# Patient Record
Sex: FEMALE
[De-identification: names, ages, dates, MRNs, and addresses within clinical notes are randomized per-mention and may not be internally consistent; named-entity substitution may affect disease eponyms.]

---

## 2017-08-02 PROBLEM — Z00.00 ENCOUNTER FOR PREVENTIVE HEALTH EXAMINATION: Status: ACTIVE | Noted: 2017-08-02

## 2017-09-28 ENCOUNTER — APPOINTMENT (OUTPATIENT)
Dept: ENDOCRINOLOGY | Facility: CLINIC | Age: 62
End: 2017-09-28

## 2017-10-13 ENCOUNTER — MEDICATION RENEWAL (OUTPATIENT)
Age: 62
End: 2017-10-13

## 2018-01-11 ENCOUNTER — APPOINTMENT (OUTPATIENT)
Dept: ENDOCRINOLOGY | Facility: CLINIC | Age: 63
End: 2018-01-11
Payer: COMMERCIAL

## 2018-01-11 VITALS
HEART RATE: 74 BPM | HEIGHT: 67 IN | DIASTOLIC BLOOD PRESSURE: 100 MMHG | OXYGEN SATURATION: 99 % | TEMPERATURE: 97.6 F | WEIGHT: 202 LBS | BODY MASS INDEX: 31.71 KG/M2 | SYSTOLIC BLOOD PRESSURE: 155 MMHG

## 2018-01-11 DIAGNOSIS — Z83.49 FAMILY HISTORY OF OTHER ENDOCRINE, NUTRITIONAL AND METABOLIC DISEASES: ICD-10-CM

## 2018-01-11 DIAGNOSIS — D25.9 LEIOMYOMA OF UTERUS, UNSPECIFIED: ICD-10-CM

## 2018-01-11 DIAGNOSIS — E55.9 VITAMIN D DEFICIENCY, UNSPECIFIED: ICD-10-CM

## 2018-01-11 DIAGNOSIS — K76.0 FATTY (CHANGE OF) LIVER, NOT ELSEWHERE CLASSIFIED: ICD-10-CM

## 2018-01-11 DIAGNOSIS — Z82.49 FAMILY HISTORY OF ISCHEMIC HEART DISEASE AND OTHER DISEASES OF THE CIRCULATORY SYSTEM: ICD-10-CM

## 2018-01-11 PROCEDURE — 99215 OFFICE O/P EST HI 40 MIN: CPT

## 2018-01-14 PROBLEM — Z82.49 FAMILY HISTORY OF HYPERTENSION: Status: ACTIVE | Noted: 2018-01-14

## 2018-01-14 PROBLEM — E55.9 VITAMIN D DEFICIENCY: Status: ACTIVE | Noted: 2018-01-14

## 2018-01-14 PROBLEM — K76.0 HEPATIC STEATOSIS: Status: ACTIVE | Noted: 2018-01-14

## 2018-01-14 PROBLEM — D25.9 FIBROID UTERUS: Status: ACTIVE | Noted: 2018-01-14

## 2018-01-14 PROBLEM — Z83.49 FAMILY HISTORY OF GOITER: Status: ACTIVE | Noted: 2018-01-14

## 2018-01-14 RX ORDER — BLOOD-GLUCOSE METER
EACH MISCELLANEOUS
Qty: 1 | Refills: 3 | Status: ACTIVE | COMMUNITY
Start: 2018-01-14 | End: 1900-01-01

## 2018-01-14 RX ORDER — CHLORHEXIDINE GLUCONATE 4 %
1000 LIQUID (ML) TOPICAL
Refills: 0 | Status: ACTIVE | COMMUNITY
Start: 2018-01-14

## 2018-01-14 RX ORDER — ENALAPRIL MALEATE 20 MG/1
20 TABLET ORAL TWICE DAILY
Qty: 180 | Refills: 3 | Status: ACTIVE | COMMUNITY
Start: 2018-01-14 | End: 1900-01-01

## 2018-01-14 RX ORDER — UBIDECARENONE/VIT E ACET 100MG-5
50 MCG CAPSULE ORAL
Refills: 0 | Status: ACTIVE | COMMUNITY
Start: 2018-01-14

## 2018-01-14 RX ORDER — ATORVASTATIN CALCIUM 80 MG/1
80 TABLET, FILM COATED ORAL
Qty: 90 | Refills: 3 | Status: ACTIVE | COMMUNITY
Start: 2018-01-14 | End: 1900-01-01

## 2018-01-14 RX ORDER — LEVOTHYROXINE SODIUM 0.07 MG/1
75 TABLET ORAL
Qty: 90 | Refills: 3 | Status: ACTIVE | COMMUNITY
Start: 2018-01-14 | End: 1900-01-01

## 2018-04-30 ENCOUNTER — APPOINTMENT (OUTPATIENT)
Dept: ENDOCRINOLOGY | Facility: CLINIC | Age: 63
End: 2018-04-30
Payer: COMMERCIAL

## 2018-04-30 VITALS
DIASTOLIC BLOOD PRESSURE: 96 MMHG | SYSTOLIC BLOOD PRESSURE: 156 MMHG | OXYGEN SATURATION: 98 % | TEMPERATURE: 97.9 F | HEART RATE: 74 BPM | WEIGHT: 203 LBS | HEIGHT: 67 IN | BODY MASS INDEX: 31.86 KG/M2

## 2018-04-30 DIAGNOSIS — R20.0 ANESTHESIA OF SKIN: ICD-10-CM

## 2018-04-30 PROCEDURE — 99214 OFFICE O/P EST MOD 30 MIN: CPT

## 2018-06-07 ENCOUNTER — MEDICATION RENEWAL (OUTPATIENT)
Age: 63
End: 2018-06-07

## 2018-07-23 ENCOUNTER — APPOINTMENT (OUTPATIENT)
Dept: ENDOCRINOLOGY | Facility: CLINIC | Age: 63
End: 2018-07-23
Payer: COMMERCIAL

## 2018-07-23 VITALS
SYSTOLIC BLOOD PRESSURE: 157 MMHG | DIASTOLIC BLOOD PRESSURE: 90 MMHG | BODY MASS INDEX: 32.14 KG/M2 | RESPIRATION RATE: 16 BRPM | TEMPERATURE: 98.8 F | WEIGHT: 200 LBS | HEART RATE: 80 BPM | HEIGHT: 66 IN | OXYGEN SATURATION: 97 %

## 2018-07-23 PROCEDURE — 99214 OFFICE O/P EST MOD 30 MIN: CPT

## 2018-10-25 ENCOUNTER — APPOINTMENT (OUTPATIENT)
Dept: ENDOCRINOLOGY | Facility: CLINIC | Age: 63
End: 2018-10-25
Payer: COMMERCIAL

## 2018-10-25 VITALS
BODY MASS INDEX: 32.14 KG/M2 | TEMPERATURE: 98.6 F | HEIGHT: 66 IN | HEART RATE: 73 BPM | DIASTOLIC BLOOD PRESSURE: 84 MMHG | WEIGHT: 200 LBS | OXYGEN SATURATION: 94 % | SYSTOLIC BLOOD PRESSURE: 153 MMHG

## 2018-10-25 VITALS — BODY MASS INDEX: 32.38 KG/M2 | HEIGHT: 66 IN | WEIGHT: 201.5 LBS

## 2018-10-25 PROCEDURE — 99214 OFFICE O/P EST MOD 30 MIN: CPT

## 2019-01-18 ENCOUNTER — MEDICATION RENEWAL (OUTPATIENT)
Age: 64
End: 2019-01-18

## 2019-02-07 ENCOUNTER — APPOINTMENT (OUTPATIENT)
Dept: ENDOCRINOLOGY | Facility: CLINIC | Age: 64
End: 2019-02-07
Payer: COMMERCIAL

## 2019-02-07 VITALS
HEART RATE: 73 BPM | OXYGEN SATURATION: 99 % | TEMPERATURE: 98.7 F | SYSTOLIC BLOOD PRESSURE: 159 MMHG | DIASTOLIC BLOOD PRESSURE: 85 MMHG | BODY MASS INDEX: 32.14 KG/M2 | WEIGHT: 200 LBS | HEIGHT: 66 IN

## 2019-02-07 PROCEDURE — 99214 OFFICE O/P EST MOD 30 MIN: CPT

## 2019-02-10 NOTE — DATA REVIEWED
[FreeTextEntry1] : [facility]  (2/4/19)\par \par ,  A1c 10.3%\par CMP WNL\par LDL 99, HDL 40, TG 79\par Urine microalb ratio positive at 87.6  (normal < 30)\par TSH level 0.948 uU/ml  (0.45-4.5)\par 25-D level in target range at 37 ng/ml

## 2019-02-10 NOTE — REVIEW OF SYSTEMS
[Fatigue] : no fatigue [Decreased Appetite] : appetite not decreased [Recent Weight Gain (___ Lbs)] : no recent weight gain [Recent Weight Loss (___ Lbs)] : no recent weight loss [Fever] : no fever [Chills] : no chills [Blurry Vision] : no blurred vision [Dry Eyes] : no dryness of the eyes [Eyes Itch] : no itching of the eyes [Eye Pain] : no eye pain [Dysphagia] : no dysphagia [Hearing Loss] : no hearing loss  [Chest Pain] : no chest pain [Palpitations] : no palpitations [Lower Ext Edema] : no lower extremity edema [Shortness Of Breath] : no shortness of breath [Wheezing] : no wheezing was heard [SOB on Exertion] : no shortness of breath during exertion [Nausea] : no nausea [Constipation] : no constipation [Diarrhea] : no diarrhea [Heartburn] : no heartburn [Polyuria] : no polyuria [Dysuria] : no dysuria [Muscle Cramps] : no muscle cramps [Myalgia] : no myalgia  [Hirsutism] : no hirsutism [Hair Loss] : no hair loss [Dry Skin] : no dry skin [Headache] : no headaches [Tremors] : no tremors [Pain/Numbness of Digits] : no pain/numbness of digits [Difficulty Walking] : no difficulty walking [Depression] : no depression [Anxiety] : no anxiety [Insomnia] : no insomnia [Stress] : no stress [Polydipsia] : no polydipsia [Cold Intolerance] : cold tolerant [Heat Intolerance] : heat tolerant [Easy Bleeding] : no ~M tendency for easy bleeding [Easy Bruising] : no tendency for easy bruising [FreeTextEntry6] : Seer HPI re: persistent cough since her respiratory tract infection [FreeTextEntry7] : Has not had any recurrences of abdominal pain and vomiting [FreeTextEntry8] : Marked increase in urine output when she takes the HCTZ.  Nocturia once a night [FreeTextEntry9] : Moderate pain in both knees

## 2019-02-10 NOTE — PHYSICAL EXAM
[Alert] : alert [No Acute Distress] : no acute distress [Normal Sclera/Conjunctiva] : normal sclera/conjunctiva [PERRL] : pupils equal, round and reactive to light [EOMI] : extra ocular movement intact [No Proptosis] : no proptosis [No Lid Lag] : no lid lag [Normal Outer Ear/Nose] : the ears and nose were normal in appearance [Normal Hearing] : hearing was normal [No Neck Mass] : no neck mass was observed [No LAD] : no lymphadenopathy [Clear to Auscultation] : lungs were clear to auscultation bilaterally [Normal Rate] : heart rate was normal  [Regular Rhythm] : with a regular rhythm [Carotids Normal] : carotid pulses were normal with no bruits [No Edema] : there was no peripheral edema [Not Tender] : non-tender [Soft] : abdomen soft [No HSM] : no hepato-splenomegaly [Normal] : normal and non tender [No CVA Tenderness] : no ~M costovertebral angle tenderness [Normal Gait] : normal gait [No Joint Swelling] : no joint swelling seen [Normal Strength/Tone] : muscle strength and tone were normal [No Involuntary Movements] : no involuntary movements were seen [No Rash] : no rash [No Tremors] : no tremors [Normal Sensation on Monofilament Testing] : normal sensation on monofilament testing of lower extremities [Normal Affect] : the affect was normal [Normal Mood] : the mood was normal [Kyphosis] : no kyphosis present [Foot Ulcers] : no foot ulcers [Abdominal Striae] : no abdominal striae [de-identified] : Moderately obese [de-identified] : No corneal arcus or xanthelasma [de-identified] : Thyroidectomy scar.  Thyroid gland is non-palpable [de-identified] : No murmurs [de-identified] : DP pulses 2+ on the left, non-palpable (but with normal capillary refill) on the right [de-identified] : No cervical or supraclavicular adenopathy [de-identified] : Mild acanthosis in the posterior cervical area [de-identified] : Vibratory sensation significantly decreased over the toes

## 2019-02-10 NOTE — HISTORY OF PRESENT ILLNESS
[FreeTextEntry1] : 63-year-old woman who is followed for insulin-requiring type 2 DM, hyperlipidemia, post-surgical hypothyroidism and hypertension.  Her diabetes was initially diagnosed in 2004, and she has been insulin-requiring since 2011.  Her glycemic control has been generally quite poor (A1c levels 9-11%), primarily as a result of diet lapses and the difficulties of establishing an optimal meal/insulin schedule since she works at night.  Her disease has been complicated by significant non-proliferative retinopathy and by microalbuminuria.  She has been hypothyroid since undergoing a sub-total thyroidectomy for a large multinodular goiter with compressive symptoms in 2007.  Her other active medical problems include hepatic steatosis and obesity..\par \malcolm Has been ill with a respiratory tract infection for the past 2 weeks.  Was seen at Urgent Care, and given prescriptions for a Z-pack and cough syrup.  DId not have any significant fever.  Was unable to tolerate nasal swabbing to test for influenza.  Her nasal congestion has resolved, but she still has a non-productive cough which is worse at night.\malcolm Describes her blood sugars as "fluctuating"--The morning values (when she gets home from work) are "200," and she says that these are higher than the night before even when she does not eat anything at work.  Fingersticks before lunch then decrease to "140," but go back up by 8 PM when she awakens.  She is still not eating supper at home, but instead brings leftovers from lunch to work. Her largest meal (on workdays) is still at midday before she goes to sleep.  She has not done any fingerstick testing 2-3 hours after this meal (when she is usually awakened to urinate) nor after the meal in the late evening.\malcolm Had one hypoglycemic reaction in the late afternoon on a weekend day when she was not working and had eaten a particularly small lunch.\malcolm Says that her BPs have been particularly high during her recent illness, but admits that she has been taking OTC decongestants during her respiratory tract infection.  She also admits that she has been skipping many doses of HCTZ, apparently because of increased urine output after she takes the capsules.\par She is not due to see her ophthalmologist for follow-up until  April.   Her exam at the last visit in October was apparently somewhat improved.\malcolm Is still having oatmeal for breakfast, but without any fruit, and she has stopped the mid-morning snack.\par Usual starch at the midday meal is rice, only "a small portion."\par She brings an apple to work to have with her evening meal--sometimes has this with the meal, sometimes at 3 AM.

## 2019-02-10 NOTE — ASSESSMENT
[FreeTextEntry1] : 1) Type 2 DM:  Glycemic control remains quite poor, with the usual barriers to improved control.\par --Given her rising blood sugars during the time when she sleeps, plus the elevated glucoses when she returns home from work at 7 AM, will increase the Lantus to 58 units.  \par --Her pre-lunch fingersticks are significantly improved, likely due to her elimination of the mid-morning snack.  She is likely hyperglycemic after her other meals, however, so will increase the Humalog for lunch and supper to 26 units.\par --Needs to test fingersticks before and after her supper at work.  Also needs to try to test in the afternoon if she awakens to urinate.\par --It is difficult to determine whether her portion of rice at her main meal is excessive.  Suggested that she take a picture (with her cellphone) of a typical meal which she eats at midday\par \par 2) Hyperlipidemia:  LDL-cholesterol is once again somewhat above her target of 70 mg%.  Suspect that she has been less than perfectly compliant with her atorvastatin/Zetia regimen.\par --Continue atorvastatin 80 mg/day plus Zetia\par \par 3) Hypertension:  BP is moderately elevated at today's visit, as it apparently has been during the past 2 weeks.  The exacerbation is likely due to a combination of decongestant use and missed doses of HCTZ.\par --Told her that she needs to stop the decongestants, and avoid these during future URIs\par --Emphasized the need to take the HCTZ on a consistent basis, since her BP will be almost impossible to optimize without a diuretic.  Pointed out to her that the increased urine output after taking the medication likely indicated an excessive salt intake, but was also due to the intermittency of administration.\par \par 4) Hypothyroidism: TSH level is in the optimal range of 0.4-2.5 uU/ml.\par --Continue levothyroxine 75 mcg/day\par \par See for follow-up in 3-4 months.  CMP, lipids, A1c, microalb and B-12 level before the visit [FreeTextEntry2] : Diet, need for 2-hour post-prandial FS monitoring, need for consistency in taking HCTZ

## 2019-05-15 ENCOUNTER — APPOINTMENT (OUTPATIENT)
Dept: ENDOCRINOLOGY | Facility: CLINIC | Age: 64
End: 2019-05-15
Payer: COMMERCIAL

## 2019-05-15 VITALS
HEIGHT: 66 IN | HEART RATE: 75 BPM | WEIGHT: 202 LBS | OXYGEN SATURATION: 95 % | BODY MASS INDEX: 32.47 KG/M2 | SYSTOLIC BLOOD PRESSURE: 168 MMHG | TEMPERATURE: 97.7 F | DIASTOLIC BLOOD PRESSURE: 91 MMHG

## 2019-05-15 DIAGNOSIS — Z86.73 PERSONAL HISTORY OF TRANSIENT ISCHEMIC ATTACK (TIA), AND CEREBRAL INFARCTION W/OUT RESIDUAL DEFICITS: ICD-10-CM

## 2019-05-15 DIAGNOSIS — I25.10 ATHEROSCLEROTIC HEART DISEASE OF NATIVE CORONARY ARTERY W/OUT ANGINA PECTORIS: ICD-10-CM

## 2019-05-15 PROCEDURE — 99215 OFFICE O/P EST HI 40 MIN: CPT

## 2019-05-15 RX ORDER — AMLODIPINE BESYLATE 10 MG/1
10 TABLET ORAL
Qty: 90 | Refills: 3 | Status: DISCONTINUED | COMMUNITY
Start: 2018-01-14 | End: 2019-05-15

## 2019-05-15 RX ORDER — ASPIRIN 81 MG/1
81 TABLET, COATED ORAL DAILY
Refills: 0 | Status: ACTIVE | COMMUNITY
Start: 2019-05-15

## 2019-05-16 NOTE — ASSESSMENT
[FreeTextEntry2] : Diet, post-prandial FS monitoring, LDL-targets, coronary artery calcifications [FreeTextEntry1] : 1) Type 2 DM:  Glycemic control remains poor, with the usual barriers to improved control remaining unchanged--essentially a meal schedule which is distinctly sub-optimal for her current work/sleep schedule, and a failure to assess post-prandial glycemic control.\par Again discussed possible changes in her regimen for her to try:\par --She is overly hungry for breakfast because she has eaten nothing but a salad since noon the day before.  To continue having the salad when she gets to work (without any insulin), but then have half a sandwich at 2-3 AM (with 15 units of Humalog) on her break.\par --To increase her pre-breakfast Humalog back to 26 units.\par --To check fingersticks at 3-4 PM (in the middle of her sleep cycle) if she happens to awaken to urinate--explained that this reading would help assess whether her Humalog dose for her main meal at noon was adequate, and whether her elevated fingerstick when she awoke in the early evening was a carryover from post-lunch hyperglycemia or indicated the need for a higher dose of Lantus..\par --She also needs to move her Lantus back to her bedtime (which is actually at 1 PM)--they changed it when she was in the hospital.  Told her to move the dose to the mid-afternoon on 5/18 when she is not working, then to noon the following day.\par \par 2) Hyperlipidemia:  LDL-cholesterol is obviously well above goal, with the target of 70 mg% now even firmer given the evidence of coronary artery calcifications noted on the CT scan.  She is clearly missing multiple doses of atorvastatin, and has also completely lapsed on the Zetia.\par --Pt cautioned about the need for aggressive lipid-lowering therapy, and the implications of the coronary artery calcifications were discussed.\par --Emphasized the need for her to take the atorvastatin regularly, and I sent a new prescription for the Zetia to the pharmacy.\par \par 3) Hypertension:  BP is distinctly elevated today, and has also been elevated on most of the readings which she has had checked at work.\par --As a first step, to change the amlodipine to nifedipine (at 60 mg/day).  She previously had problems with edema on nifedipine, so will have to watch for this.\par --Continue HCTZ, enalapril and metoprolol\par --To continue having BPs checked frequently at work, and to check in with me in 2-3 weeks regarding her progress.\par \par 4) Microalbuminuria:  Microalbumin ratio is only borderline positive on the current labwork.  She is already on a maximum dose of enalapril, but BP appears to be under poor control.  Her hypertension is clearly the major factor to address in this regard\par \par 5) Obesity:  PT has gained 2 lb in weight, and her diet history is obviously somewhat "incomplete."\par \par 6) Pancreatic cyst:  Lesions seen on CT are < 1 cm, but mucinous neoplasms can be malignant or pre-malignant, and pt probably warrants an EUS with biopsy/cytology.  Will likely refer to a pancreas specialist for an opinion\par \par See for follow-up in 3-4 months.  CMP, lipids, A1c, microalb, TFTs, 25-D before the visit\par \par Spent 45 min with the pt, > 50% on counseling regarding her multiple issues, and also reviewing her recent medical events and hospitalizations

## 2019-05-16 NOTE — HISTORY OF PRESENT ILLNESS
[FreeTextEntry1] : 63-year-old woman who is followed for insulin-requiring type 2 DM, hyperlipidemia, post-surgical hypothyroidism and hypertension.  Her diabetes was initially diagnosed in 2004, and she has been insulin-requiring since 2011.  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  Her disease has been complicated by significant non-proliferative retinopathy and by microalbuminuria.  She has been hypothyroid since undergoing a sub-total thyroidectomy for a large multinodular goiter with compressive symptoms in 2007.  Her other active medical problems include hepatic steatosis and obesity..\par \par Interim history since her last visit:\par --Developed acute onset of vertigo while walking home from work in March.  Did not improve after she got home and went to sleep.  Went to Urgent Care the next day, was told of a /100 and a pulse rate in the 40s.  The physician told her to see a cardiologist the next day, but she developed worsening symptoms at the cardiologist's office, and was sent to the Emergency Room.  She was hospitalized (in [facility]) for 3 days, and told of a "possible stroke."  She saw a neurologist after discharge, has had a brain MRI done, but has not been told of the results.  \par --She also saw a gastroenterologist for R flank pain, and had an abdominal CT which showed two sub-centimeter pancreatic hypodensities suspicious for mucinous neoplasms. No further work-up was done, and she was simply told that she needed a follow-up CT in a year.  The CT was negative for any pathology which might have accounted for her R-sided abdominal pain, which has since resolved spontaneously..  \par (Gastroenterologist is Jyoti Morillo--[phone])\par \par Says that her blood sugars have been about the same.  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  She then goes to sleep an hour later.  Will occasionally wake up to urinate at 3-4 PM, but still has not checked any fingersticks at those times.  Has not had any symptomatic hypoglycemia awakening her from sleep.\par Her fingerstick when she wakes up at 8 PM has usually risen to over 200 mg%.\par --She does not eat anything when she gets up, but brings a salad to work and eats it before she starts her shift.  Takes 26 units for the salad, does not have any starch with it, but does not become hypoglycemic.\par \par The only medication change since her last visit is the addition of a baby aspirin.\par Blood pressures checked at work have been elevated, with diastolics close to 100.  She has cut down her salt intake at home.  \par Ophth exam last month showed stable retinopathy.\par Denies any numbness or paresthesias in her feet\par Admits to missing "a few days" of the atorvastatin, and does not seem to be taking the ezetimibe.

## 2019-05-16 NOTE — DATA REVIEWED
[FreeTextEntry1] : [facility]  (5/13/19)\par \par ,  A1c 9.7%\par CMP--borderline elevated alk phos  (119 U/l), otherwise normal\par Urine microalb borderline positive at 34  (normal < 30)\par , HDL 41, TG 86\par B-12 in target range at 469 pg/ml

## 2019-05-16 NOTE — PHYSICAL EXAM
[Alert] : alert [No Acute Distress] : no acute distress [No Proptosis] : no proptosis [EOMI] : extra ocular movement intact [No Lid Lag] : no lid lag [No Neck Mass] : no neck mass was observed [Normal Outer Ear/Nose] : the ears and nose were normal in appearance [Normal Hearing] : hearing was normal [Clear to Auscultation] : lungs were clear to auscultation bilaterally [No LAD] : no lymphadenopathy [Regular Rhythm] : with a regular rhythm [Normal Rate] : heart rate was normal  [Carotids Normal] : carotid pulses were normal with no bruits [Not Tender] : non-tender [No Edema] : there was no peripheral edema [Soft] : abdomen soft [No CVA Tenderness] : no ~M costovertebral angle tenderness [Normal] : normal and non tender [No Involuntary Movements] : no involuntary movements were seen [Normal Strength/Tone] : muscle strength and tone were normal [Normal Gait] : normal gait [No Rash] : no rash [No Tremors] : no tremors [Normal Sensation on Monofilament Testing] : normal sensation on monofilament testing of lower extremities [Normal Mood] : the mood was normal [Normal Affect] : the affect was normal [Kyphosis] : no kyphosis present [Foot Ulcers] : no foot ulcers [de-identified] : Moderately obese [de-identified] : Pupils miotic.  Mild conjunctival injection.  No corneal arcus [de-identified] : Thyroidectomy scar.  No palpable thyroid tissue [de-identified] : DP pulses 2+ on the right, non-palpable on the left [de-identified] : No murmurs [de-identified] : Liver edge 2-3 FB below the RCM [de-identified] : No cervical or supraclavicular adenopathy [de-identified] : Mild L ankle swelling [de-identified] : Mild acanthosis in the posterior cervical area [de-identified] : Vibratory sensation moderately decreased over the toes

## 2019-05-16 NOTE — REVIEW OF SYSTEMS
[Fatigue] : no fatigue [Decreased Appetite] : appetite not decreased [Fever] : no fever [Blurry Vision] : no blurred vision [Chills] : no chills [Eyes Itch] : no itching of the eyes [Dry Eyes] : no dryness of the eyes [Dysphagia] : no dysphagia [Hearing Loss] : no hearing loss  [Chest Pain] : no chest pain [Palpitations] : no palpitations [Lower Ext Edema] : no lower extremity edema [Shortness Of Breath] : no shortness of breath [Cough] : no cough [Wheezing] : no wheezing was heard [SOB on Exertion] : no shortness of breath during exertion [Constipation] : no constipation [Nausea] : no nausea [Heartburn] : no heartburn [Diarrhea] : no diarrhea [Dysuria] : no dysuria [Polyuria] : no polyuria [Muscle Cramps] : no muscle cramps [Myalgia] : no myalgia  [Dry Skin] : no dry skin [Hair Loss] : no hair loss [Headache] : no headaches [Pain/Numbness of Digits] : no pain/numbness of digits [Tremors] : no tremors [Difficulty Walking] : no difficulty walking [Depression] : no depression [Anxiety] : no anxiety [Insomnia] : no insomnia [Stress] : no stress [Polydipsia] : no polydipsia [Cold Intolerance] : cold tolerant [Heat Intolerance] : heat tolerant [Easy Bleeding] : no ~M tendency for easy bleeding [Easy Bruising] : no tendency for easy bruising [FreeTextEntry2] : Has gained 2 lb since her last visit [FreeTextEntry3] : Recent problems with excessive tearing [FreeTextEntry8] : Occasional nocturia, no more than once a night [FreeTextEntry9] : Pain and mild swelling in both knees and her left ankle [de-identified] : Vertigo has completely resolved

## 2019-05-16 NOTE — REASON FOR VISIT
[Follow-Up: _____] : a [unfilled] follow-up visit [FreeTextEntry1] : type 2 DM, hyperlipidemia and hypertension

## 2019-08-21 ENCOUNTER — APPOINTMENT (OUTPATIENT)
Dept: ENDOCRINOLOGY | Facility: CLINIC | Age: 64
End: 2019-08-21
Payer: COMMERCIAL

## 2019-08-21 VITALS
WEIGHT: 203 LBS | HEART RATE: 89 BPM | BODY MASS INDEX: 32.62 KG/M2 | HEIGHT: 66 IN | SYSTOLIC BLOOD PRESSURE: 107 MMHG | DIASTOLIC BLOOD PRESSURE: 71 MMHG | TEMPERATURE: 98.5 F | OXYGEN SATURATION: 95 %

## 2019-08-21 DIAGNOSIS — K86.2 CYST OF PANCREAS: ICD-10-CM

## 2019-08-21 DIAGNOSIS — I67.89 OTHER CEREBROVASCULAR DISEASE: ICD-10-CM

## 2019-08-21 DIAGNOSIS — K63.5 POLYP OF COLON: ICD-10-CM

## 2019-08-21 PROCEDURE — 99215 OFFICE O/P EST HI 40 MIN: CPT

## 2019-08-21 RX ORDER — FLASH GLUCOSE SCANNING READER
EACH MISCELLANEOUS
Qty: 1 | Refills: 3 | Status: ACTIVE | COMMUNITY
Start: 2019-08-21 | End: 1900-01-01

## 2019-08-21 RX ORDER — METOPROLOL SUCCINATE 50 MG/1
50 TABLET, EXTENDED RELEASE ORAL DAILY
Qty: 90 | Refills: 3 | Status: DISCONTINUED | COMMUNITY
Start: 2018-01-14 | End: 2019-08-21

## 2019-08-21 RX ORDER — FLASH GLUCOSE SENSOR
KIT MISCELLANEOUS
Qty: 6 | Refills: 3 | Status: ACTIVE | COMMUNITY
Start: 2019-08-21 | End: 1900-01-01

## 2019-08-25 PROBLEM — K63.5 COLON POLYPS: Status: ACTIVE | Noted: 2019-08-25

## 2019-08-25 NOTE — DATA REVIEWED
[FreeTextEntry1] : [facility]  (8/16/19)\par \par ,  A1c 10.0%\par CMP--alk phos mildly elevated at 146 (normal < 117), otherwise normal\par , HDL 44, TG 59\par Urine microalb ratio mildly positive at 46 (normal < 30)\par TSH 1.31 uU/ml  (0.45-4.5)\par 25-D level excellent at 43 ng/ml

## 2019-08-25 NOTE — ASSESSMENT
[FreeTextEntry1] : 1) Type 2 DM: Glycemic control remains poor, and for the usual reasons--not testing blood sugars after lunch and supper, not taking insulin before her third meal at work, and likely excessive carbs (and calories) at her two larger meals.\par --Carbs at breakfast are clearly excessive, but given her report of dropping glucoses in the late morning, can "split" this meal into oatmeal  (1 cup) at 7-8 AM, then her 2 pieces of toast later in the AM\par --Needs to restrict her portions of rice and pasta at lunch, art since the meal usually also includes carrots\par --Still needs to test 2-3 hours after lunch, when she awakens to urinate.  \par --Needs to take insulin before her late night meal, and test after this if possible.  She also needs to make this a smaller meal, and stop partaking of the takeout food ordered by the nurses--(art the Chinese food).\par --Given her difficulty doing the necessary fingerstick monitoring (art while asleep and at work), she is asking if a Bernardo device would help. I fully agree, and will send a prescription to [facility] for the device.  She was told that her insurance will cover it\par \par 2) Hyperlipidemia:  LDL-cholesterol is above her target of 70 mg%, but she appears to have lapsed on the Zetia--was likely stopped during her hospitalization and she was not told to restart it post discharge.\par --Continue atorvastatin 80 mg/day\par --Restart Zetia\par \par 3) Hypertension:  BP is excellent on today's exam, and her readings at home and at work appear to be in target range.  Her edema, however, may be due to her taking both nifedipine plus amlodipine.\par --To D;/C the amlodipine (while continuing the nifedipine), and see if her edema improves.  Will also need to watch that her BP does not begin to rise off the amlodipine.\par --Continue off the metoprolol given the previous bradycardia\par --Continue monitoring BPs at home and at work\par --Explained to her that the CNS microvascular changes on the MRI are not purely due to her age--more likely reflect her long history of imperfectly controlled HBP\par \par 4) Microalbuminuria:  Is only mild at this point.\par --Continue enalapril, aim for optimal BP control\par \par 5) Hypothyroidism:  TSH level is in the optimal range of 0.4-2.5 uU/ml.\par --Continue levothyroxine 75 mcg/day\par \par 6) Pancreatic cyst:  CT scan had suggested a possible mucinous neoplasm.\par --Pt given Dr. Reyes's office number in order to make an appointment for possible EUS\par \par See for follow-up in 3 months.  CMP, lipids, A1c, fructosamine, CBC, microalb, 25-D before the visit\par \par Spent 45 min with the pt, > 50% on counseling regarding her multiple problems, medication reconciliation and compliance, etc\par  [FreeTextEntry2] : Diet, FS monitoring, medication compliance

## 2019-08-25 NOTE — REVIEW OF SYSTEMS
[Hair Loss] : hair loss [Polydipsia] : polydipsia [Fatigue] : no fatigue [Decreased Appetite] : appetite not decreased [Recent Weight Gain (___ Lbs)] : no recent weight gain [Recent Weight Loss (___ Lbs)] : no recent weight loss [Fever] : no fever [Chills] : no chills [Blurry Vision] : no blurred vision [Dry Eyes] : no dryness of the eyes [Eyes Itch] : no itching of the eyes [Dysphagia] : no dysphagia [Hearing Loss] : no hearing loss  [Chest Pain] : no chest pain [Palpitations] : no palpitations [Shortness Of Breath] : no shortness of breath [Wheezing] : no wheezing was heard [Cough] : no cough [SOB on Exertion] : no shortness of breath during exertion [Nausea] : no nausea [Constipation] : no constipation [Diarrhea] : no diarrhea [Heartburn] : no heartburn [Polyuria] : no polyuria [Dysuria] : no dysuria [Joint Pain] : no joint pain [Joint Stiffness] : no joint stiffness [Muscle Cramps] : no muscle cramps [Myalgia] : no myalgia  [Dry Skin] : no dry skin [Headache] : no headaches [Tremors] : no tremors [Pain/Numbness of Digits] : no pain/numbness of digits [Difficulty Walking] : no difficulty walking [Depression] : no depression [Anxiety] : no anxiety [Insomnia] : no insomnia [Stress] : no stress [Cold Intolerance] : cold tolerant [Heat Intolerance] : heat tolerant [Easy Bleeding] : no ~M tendency for easy bleeding [Easy Bruising] : no tendency for easy bruising [FreeTextEntry4] : Tinnitus in her left ear [FreeTextEntry5] : Recent LE edema as noted in the HPI [FreeTextEntry8] : Nocturia X 2-4

## 2019-11-21 ENCOUNTER — APPOINTMENT (OUTPATIENT)
Dept: ENDOCRINOLOGY | Facility: CLINIC | Age: 64
End: 2019-11-21
Payer: COMMERCIAL

## 2019-11-21 VITALS
DIASTOLIC BLOOD PRESSURE: 101 MMHG | BODY MASS INDEX: 31.34 KG/M2 | WEIGHT: 195 LBS | HEART RATE: 78 BPM | SYSTOLIC BLOOD PRESSURE: 159 MMHG | OXYGEN SATURATION: 97 % | HEIGHT: 66 IN | TEMPERATURE: 98 F

## 2019-11-21 PROCEDURE — 99214 OFFICE O/P EST MOD 30 MIN: CPT

## 2019-11-24 NOTE — REVIEW OF SYSTEMS
[Fatigue] : fatigue [Muscle Cramps] : muscle cramps [Back Pain] : back pain [Polydipsia] : polydipsia [Fever] : no fever [Chills] : no chills [Blurry Vision] : no blurred vision [Dry Eyes] : no dryness of the eyes [Eyes Itch] : no itching of the eyes [Dysphagia] : no dysphagia [Hearing Loss] : no hearing loss  [Chest Pain] : no chest pain [Palpitations] : no palpitations [Lower Ext Edema] : no lower extremity edema [Shortness Of Breath] : no shortness of breath [Wheezing] : no wheezing was heard [SOB on Exertion] : no shortness of breath during exertion [Nausea] : no nausea [Diarrhea] : no diarrhea [Heartburn] : no heartburn [Dysuria] : no dysuria [Joint Pain] : no joint pain [Joint Stiffness] : no joint stiffness [Myalgia] : no myalgia  [Hair Loss] : no hair loss [Dry Skin] : no dry skin [Ulcer] : no ulcer [Headache] : no headaches [Tremors] : no tremors [Pain/Numbness of Digits] : no pain/numbness of digits [Difficulty Walking] : no difficulty walking [Depression] : no depression [Anxiety] : no anxiety [Insomnia] : no insomnia [Stress] : no stress [Cold Intolerance] : cold tolerant [Heat Intolerance] : heat tolerant [Easy Bleeding] : no ~M tendency for easy bleeding [Easy Bruising] : no tendency for easy bruising [FreeTextEntry2] : Has lost 8 lb since her last visit, most of it during the past week.  Appetite has also been poorer during the past week [FreeTextEntry6] : No respiratory symptoms until the past week, now with a mildly productive cough [FreeTextEntry7] : Has been mildly constipated since her colonoscopy [FreeTextEntry8] : Markedly increased urinary frequency, including being awakened at least 3 times from sleep

## 2019-11-24 NOTE — ASSESSMENT
[FreeTextEntry1] : 1) Type 2 DM:  Glycemic control remains extremely poor, with the usual barriers to improvement as before--insufficient fingerstick monitoring, dietary lapses (especially the takeout food ordered by the nurses at work), and likely insufficient insulin doses.\par --Given her report of blood sugars which seem to be over 200 mg% even during "basal" periods, will increase the Lantus to 60 units.\par --To also increase the pre-breakfast Humalog to 26 units\par --Diet was reinforced.  Needs to limit to half a banana at breakfast, and avoid the takeout food at work.\par --Again emphasized the need for her to check a fingerstick 2-3 hours after her midday meal, (when she is awakened to urinate), in order to assess the adequacy of the Humalog dose for that meal.\par \par 2) Hypothyroidism:  TFTs were not checked with the current bloodwork, but have been quite stable.\par --Continue levothyroxine 75 mcg/day\par \par 3) Hyperlipidemia:  LDL-cholesterol is well above her target of 70 mg%, and also higher than many of her previous values.  Strongly suspect inconsistent compliance with her regimen.\par --Continue atorvastatin 80 mg/day plus Zetia 10 mg/day\par \par 4) Hypertension:  BP is obviously quite high today, but she is also in moderate pain.  She claims that BPs checked at work have been < 130/80, but I would question how often these have been done.\par --Will continue her current regimen, primarily because of her report of her readings checked between office visits.\par \par 5) Microalbuminuria:  Is still only moderate in degree, but has been increasing--likely due to her poor glycemic control plus ?? inadequate BP control (see above).  She is already on a maximal dose of enalapril.  Her BP today is quite high, but she says that most of her BPs checked at work have been in target range.\par --Warned pt about the progression of her nephropathy, and the need for improved glycemic and BP control.\par \par Was very difficult to get the pt to "focus" on her medical issues--she was clearly in pain and felt ill.  She had already intended to go to the ER, and I encouraged her to follow through with this, since the source of her back and flank pain is unclear.\par See for follow-up in 3-4 months.  CMP, lipids, A1c, TFTs, microalb, 25-D before the visit [FreeTextEntry2] : Diet, 2-hr post-prandial FS monitoring, BP targets

## 2019-11-24 NOTE — PHYSICAL EXAM
[Alert] : alert [Normal Sclera/Conjunctiva] : normal sclera/conjunctiva [PERRL] : pupils equal, round and reactive to light [EOMI] : extra ocular movement intact [No Proptosis] : no proptosis [No Lid Lag] : no lid lag [Normal Outer Ear/Nose] : the ears and nose were normal in appearance [Normal Hearing] : hearing was normal [No Neck Mass] : no neck mass was observed [No LAD] : no lymphadenopathy [Clear to Auscultation] : lungs were clear to auscultation bilaterally [Normal Rate] : heart rate was normal  [Regular Rhythm] : with a regular rhythm [Carotids Normal] : carotid pulses were normal with no bruits [No Edema] : there was no peripheral edema [Soft] : abdomen soft [Normal] : normal and non tender [Normal Gait] : normal gait [No Joint Swelling] : no joint swelling seen [Normal Strength/Tone] : muscle strength and tone were normal [No Involuntary Movements] : no involuntary movements were seen [No Rash] : no rash [No Tremors] : no tremors [Normal Sensation on Monofilament Testing] : normal sensation on monofilament testing of lower extremities [Normal Affect] : the affect was normal [Normal Mood] : the mood was normal [Kyphosis] : no kyphosis present [Foot Ulcers] : no foot ulcers [de-identified] : Pt appears moderately ill, and in significant discomfort from the pain in her R lower back [de-identified] : Faint corneal arcus without xanthelasma [de-identified] : Thyroidectomy scar.  No palpable thyroid tissue [de-identified] : No murmurs [de-identified] : DP pulses 2+ bilaterally [de-identified] : Moderate RUQ guarding to deep palpation.  Liver edge 2-3 FB below thje RCM [de-identified] : No cervical or supraclavicular adenopathy [de-identified] : Tenderness and distinct muscle spasm over the R lumbar paraspinal area, with ? mild R CVA tenderness as well [de-identified] : Mild acanthosis in the posterior cervical area [de-identified] : Vibratory sensation moderately decreased over the toes

## 2019-11-24 NOTE — HISTORY OF PRESENT ILLNESS
[FreeTextEntry1] : 64-year-old woman who is followed for insulin-requiring type 2 DM, hyperlipidemia, post-surgical hypothyroidism and hypertension.  Her diabetes was initially diagnosed in 2004, and she has been insulin-requiring since 2011.  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  Her disease has been complicated by significant non-proliferative retinopathy and by microalbuminuria.  She has also been hypothyroid since undergoing a sub-total thyroidectomy for a large multinodular goiter with compressive symptoms in 2007.  Her other active medical problems include hepatic steatosis and obesity..\par \malcolm Has been ill with a respiratory tract infection for the past week--mostly a sore throat and non-productive cough, without any fever.\malcolm Is also complaining of recent R lower back and flank pain, without any radiation into her leg.  Also denies any associated dysuria or hematuria.\par Taking 50 units of Lantus at midday (before she goes to sleep), 22 units of Humalog before breakfast, 26 units before her main meal at midday, and 26-28 units before supper at 11 PM.\par Fingersticks in the morning (when she gets home from work) are still approximately 200 mg%.  Her breakfast is now either 1 cup of hot cereal or two slices of toast (not both), but she also has a full banana with the meal.  She does not test any fingersticks again until lunch, but says that these are even higher than before breakfast--usually in the mid-200s.  \par Her starch at lunch is either rice or potato, less than one cup.  Is still not testing any fingersticks when she is awakened to urinate, and the values at 9 PM when she awakens are also in the 200 range.  \par Her 11 PM meal is usually leftovers from lunch, but she has also been eating some of the takeout food ordered by the nurses.\malcolm Had a colonoscopy on 9/25 which was positive for 3 polyps, with one of them apparently adenomatous.\par Recent ophth exam was apparently stable.\malcolm Denies any numbness or paresthesias in her feet.\malcolm Says that BPs checked at work have been nearly all < 130/80

## 2019-11-24 NOTE — DATA REVIEWED
[FreeTextEntry1] : [facility]  (11/19/19)\par \par ,  A1c 10.2%\par CMP and CBC WNL\par , HDL 36, TG 56\par Urine microalb ratio positive at 75 (normal < 30)\par 25-D level just into target range at 30.2 ng/ml\par \par

## 2020-01-16 RX ORDER — HYDROCHLOROTHIAZIDE 12.5 MG/1
12.5 CAPSULE ORAL
Qty: 90 | Refills: 3 | Status: ACTIVE | COMMUNITY
Start: 2018-01-14 | End: 1900-01-01

## 2020-02-21 ENCOUNTER — APPOINTMENT (OUTPATIENT)
Dept: ENDOCRINOLOGY | Facility: CLINIC | Age: 65
End: 2020-02-21

## 2020-04-15 ENCOUNTER — APPOINTMENT (OUTPATIENT)
Dept: ENDOCRINOLOGY | Facility: CLINIC | Age: 65
End: 2020-04-15

## 2020-04-20 ENCOUNTER — RX RENEWAL (OUTPATIENT)
Age: 65
End: 2020-04-20

## 2020-04-21 DIAGNOSIS — U07.1 COVID-19: ICD-10-CM

## 2020-05-29 ENCOUNTER — APPOINTMENT (OUTPATIENT)
Dept: ENDOCRINOLOGY | Facility: CLINIC | Age: 65
End: 2020-05-29

## 2020-05-29 ENCOUNTER — TRANSCRIPTION ENCOUNTER (OUTPATIENT)
Age: 65
End: 2020-05-29

## 2020-06-09 ENCOUNTER — APPOINTMENT (OUTPATIENT)
Dept: ENDOCRINOLOGY | Facility: CLINIC | Age: 65
End: 2020-06-09
Payer: COMMERCIAL

## 2020-06-09 VITALS
SYSTOLIC BLOOD PRESSURE: 151 MMHG | WEIGHT: 205 LBS | HEART RATE: 90 BPM | HEIGHT: 66 IN | DIASTOLIC BLOOD PRESSURE: 91 MMHG | TEMPERATURE: 99.1 F | BODY MASS INDEX: 32.95 KG/M2 | OXYGEN SATURATION: 98 %

## 2020-06-09 PROCEDURE — 99215 OFFICE O/P EST HI 40 MIN: CPT

## 2020-06-09 RX ORDER — METFORMIN HYDROCHLORIDE 850 MG/1
850 TABLET, COATED ORAL TWICE DAILY
Qty: 180 | Refills: 3 | Status: ACTIVE | COMMUNITY
Start: 2018-01-14

## 2020-06-09 RX ORDER — LABETALOL HYDROCHLORIDE 100 MG/1
100 TABLET, FILM COATED ORAL
Qty: 360 | Refills: 3 | Status: ACTIVE | COMMUNITY
Start: 2020-06-09 | End: 1900-01-01

## 2020-06-09 NOTE — PHYSICAL EXAM
Reason for call:  Other   Patient called regarding (reason for call): call back  Additional comments: Pt is requesting an increase in oxyCODONE (ROXICODONE) 5 MG/5ML.    Phone number to reach patient:  Cell number on file:    Telephone Information:   Mobile [phone]       Best Time:  N/A     Can we leave a detailed message on this number?  YES     Estelle RAMOS    [facility]     [Alert] : alert [Obese] : obese [Healthy Appearance] : healthy appearance [Normal Sclera/Conjunctiva] : normal sclera/conjunctiva [EOMI] : extra ocular movement intact [PERRL] : pupils equal, round and reactive to light [No Proptosis] : no proptosis [No Lid Lag] : no lid lag [Normal Outer Ear/Nose] : the ears and nose were normal in appearance [Normal Hearing] : hearing was normal [No Neck Mass] : no neck mass was observed [No LAD] : no lymphadenopathy [Clear to Auscultation] : lungs were clear to auscultation bilaterally [Normal Rate] : heart rate was normal [Carotids Normal] : carotid pulses were normal with no bruits [Regular Rhythm] : with a regular rhythm [No Edema] : no peripheral edema [Not Tender] : non-tender [Normal Anterior Cervical Nodes] : no anterior cervical lymphadenopathy [Normal Supraclavicular Nodes] : no supraclavicular lymphadenopathy [No CVA Tenderness] : no ~M costovertebral angle tenderness [Normal Gait] : normal gait [No Involuntary Movements] : no involuntary movements were seen [No Joint Swelling] : no joint swelling seen [Normal Strength/Tone] : muscle strength and tone were normal [No Rash] : no rash [Acanthosis Nigricans] : acanthosis nigricans present [No Tremors] : no tremors [Normal Sensation on Monofilament Testing] : normal sensation on monofilament testing of lower extremities [Normal Affect] : the affect was normal [Normal Mood] : the mood was normal [Kyphosis] : no kyphosis present [Foot Ulcers] : no foot ulcers [de-identified] : No corneal arcus or xanthelasma [de-identified] : No murmurs [de-identified] : Thyroidectomy scar.  Thyroid gland still palpable--approximately 20 grams [de-identified] : DP pulses 2+ on the left, non-palpable (but with normal capillary refill) on the right [de-identified] : Obese but also somewhat distended and firm.  Liver edge 3 FB below the RCM [de-identified] : Vibratory sensation moderately decreased over the toes

## 2020-06-09 NOTE — ASSESSMENT
[FreeTextEntry1] : 1) Type 2 DM:  Glycemic control remains quite poor even though her A1c level is lower than some of her values last year.  Barriers to improvement are still her variable schedule day-to-day, dietary lapses with excess carbs, and failure to monitor post-prandial glucoses in order to assess the adequacy of her insulin doses.\par --She belatedly tells me today that she in fact got the Bernardo device and used it for one week, then stopped.  Told her that she needs to restart it, and use it to help check 2-hr post-prandial glucoses and assess glucose trends when she is asleep.\par --Had a long discussion with her about the difficulty in stabilizing her blood sugar on her current schedule, and strongly encouraged her to consider changing to a day shift at work (which is now possible because of her seniority).  She is reluctant to do this, insisting that she plans to retire by the end of the year (which I doubt that she will do because she is a workaholic).\par --To continue her present insulin for now.\par --Asked her to begin meeting with the diabetes educator from [employer] once again.  (She has remained in contact with her)\par --Diet was discussed--art in regard to portions of starch at supper, use of high-fiber starches, and avoidance of high-glycemic fruits.\par \par 2) Hypothyroidism:  TSH level is in the optimal range of 0.4-2.5 uU/ml\par --Continue levothyroxine 75 mcg/day\par \par 3) Hyperlipidemia:  LDL-cholesterol is only a trace above her target of 70 mg%--(with the target dictated by the coronary artery calcifications noted on CT).  She has obviously been compliant with her medication regimen.\par --Continue combination therapy with atorvastatin and Zetia\par \par 4) Hypertension:  BP is elevated at today's visit, and many of her readings checked at the hospital or at home have been above target (especially the diastolics).  She is already on nifedipine, enalapril and HCTZ, and insists that she takes the HCTZ.  Would ordinarily increase the nifedipine, but need to avoid this given her constipation (even though this preceded use of the drug).  The metoprolol had to be stopped because of bradycardia, but labetalol would not likely be a problem\par --To start labetalol 100 mg BID.\par --Monitor heart rates and let me know if they fall below 60/min\par --If BPs remain mostly above 140/85, increase the labetalol to 200 mg BID\par \par 5) Constipation:  May be secondary to diabetic enteropathy, but seems to have been too acute in onset--(i.e. after the colonoscopy).\par --To increase the docusate to 300 mg/day\par --Continue Miralax\par --Add Metamucil or Fibercon--cautioned to start at a minimal dose, work up gradually\par --Needs to increase her dietary fiber intake-emphasized that she needs to stay with sweet potatoes, brown rice, WW pasta\par \par 6) Microalbuminuria:  Ratio is only borderline positive on the current labwork\par --Continue enalapril and (hopefully) optimal BP control\par \par Spent 45 min with the pt, > 50% on counseling regarding the multiple problems above (art the uncontrolled DM)\par \par See for follow-up in 3 months.  CMP, lipids, A1c, fructosamine, TFTs and microalb before the visit [FreeTextEntry2] : Diet, BP targets, schedule

## 2020-06-09 NOTE — HISTORY OF PRESENT ILLNESS
[FreeTextEntry1] : 64-year-old woman who is followed for insulin-requiring type 2 DM, hyperlipidemia, post-surgical hypothyroidism and hypertension.  Her diabetes was initially diagnosed in 2004, and she has been insulin-requiring since 2011.  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  Her disease has been complicated by significant non-proliferative retinopathy and by microalbuminuria.  She has also been hypothyroid since undergoing a sub-total thyroidectomy for a large multinodular goiter with compressive symptoms in 2007.  Her other active medical problems include hepatic steatosis and obesity..\malcolm \malcolm Interim history since her last visit is significant for a COVID infection in late March/early April.  Was acutely ill for approximately 1 week.  Symptoms were fever (max 102.8), myalgias, chills, HA, cough (without any significant dyspnea) and loss of taste and smell.\malcolm Had a positive nasal swab, and last month had serology done which was positive.\malcolm calderón Saw her gastroenterologist regarding symptoms of epigastric fullness and a sense of food "just sitting there" after she eats.  Had a gastric emptying study yesterday, but says that she did not have her usual symptoms during the test.  (Had an EGD and colonoscopy last year--the EGD has not been repeated to evaluate her current symptoms).\malcolm Has also been chronically constipated since her colonoscopy.  Is taking Colase, but only 100 mg/day.  Is also taking daily Miralax, but is still having bowel movements only every 3 days.  Is not taking any fiber supplements.\malcolm Had another colonoscopy in January--was positive for an additional 4 polyps (seen this time because the first study was done after a sub-optimal prep)\malcolm calderón Is taking only 50 units of Lantus/day--did not increase as instructed at her last visit.  Pre-meal Humalog is 28-30 units.\malcolm Has been generally more fatigued since her COVID infection, and her routine has changed as a result.  She now goes to sleep 2 hours after breakfast (if she has worked the night before) because she is too tired to stay awake until noon.  She will then sleep until the late afternoon, get up and eat supper, and go back to sleep until 9 PM.  She takes the Lantus at 5 PM with supper.\par She says that she does not eat another meal during her shift at work, but also admits that she will snack on fruit.\par --Breakfast is usually 1/2 cup of oatmeal, an egg and a dinner roll.  Will occasionally have half a banana with the meal\par --Starch at supper is rice (white or brown), pasta or potatoes (nearly always white).  She is vague about the portions, but says that her sister tells her that they are too large.  She will also usually have fruit with the meal (recently a full tony).\par --Snacking on fruit at work is often with grapes.\par Her fingerstick log for the past 2 weeks shows only one or two readings a day, and these are nearly all in the 100-200 range. She says that these are better than usual, because her gastroenterologist told her that her sugar needed to be below 200 for the gastric emptying study, so she has been "eating less."\par BPs checked at home and work have been >140 systolic approximately 1/3 of the time, and in the  range at least half the time.\malcolm Will not be seeing her ophthalmologist until July.\malcolm Denies any numbness or paresthesias in her feet.

## 2020-06-09 NOTE — REVIEW OF SYSTEMS
[Fatigue] : fatigue [Constipation] : constipation [Decreased Appetite] : appetite not decreased [Fever] : no fever [Chills] : no chills [Dry Eyes] : no dryness [Blurred Vision] : no blurred vision [Eyes Itch] : no itch [Dysphagia] : no dysphagia [Hearing Loss] : no hearing loss  [Chest Pain] : no chest pain [Palpitations] : no palpitations [Shortness Of Breath] : no shortness of breath [Cough] : no cough [Wheezing] : no wheezing [SOB on Exertion] : no shortness of breath on exertion [Nausea] : no nausea [Heartburn] : no heartburn [Polyuria] : no polyuria [Diarrhea] : no diarrhea [Dysuria] : no dysuria [Myalgia] : no myalgia  [Muscle Cramps] : no muscle cramps [Dry Skin] : no dry skin [Ulcer] : no ulcer [Difficulty Walking] : no difficulty walking [Tremors] : no tremors [Pain/Numbness of Digits] : no pain/numbness of digits [Depression] : no depression [Insomnia] : no insomnia [Stress] : no stress [Polydipsia] : no polydipsia [Cold Intolerance] : no cold intolerance [Heat Intolerance] : no heat intolerance [Easy Bleeding] : no ~M tendency for easy bleeding [Easy Bruising] : no tendency for easy bruising [FreeTextEntry2] : Has gained 10 lb since her last visit, but is now actually just back to her usual "baseline" [FreeTextEntry4] : Feels her ears "popping" quite frequently [FreeTextEntry5] : Occasional LE edema if she sits for a long period [FreeTextEntry8] : Nocturia only once during her sleep cycle [FreeTextEntry9] : Has trouble with stiffness of the left 2nd and right 5th fingers.  No large joint arthralgias [de-identified] : Headaches only when her BP is elevated

## 2020-07-20 RX ORDER — LANCETS
EACH MISCELLANEOUS
Qty: 400 | Refills: 3 | Status: ACTIVE | COMMUNITY
Start: 2018-01-14 | End: 1900-01-01

## 2020-07-24 ENCOUNTER — APPOINTMENT (OUTPATIENT)
Dept: ENDOCRINOLOGY | Facility: CLINIC | Age: 65
End: 2020-07-24

## 2020-09-11 ENCOUNTER — APPOINTMENT (OUTPATIENT)
Dept: ENDOCRINOLOGY | Facility: CLINIC | Age: 65
End: 2020-09-11
Payer: COMMERCIAL

## 2020-09-11 VITALS
SYSTOLIC BLOOD PRESSURE: 100 MMHG | WEIGHT: 210 LBS | BODY MASS INDEX: 33.75 KG/M2 | DIASTOLIC BLOOD PRESSURE: 62 MMHG | OXYGEN SATURATION: 100 % | HEIGHT: 66 IN | HEART RATE: 69 BPM | TEMPERATURE: 98.4 F

## 2020-09-11 PROCEDURE — 99215 OFFICE O/P EST HI 40 MIN: CPT

## 2020-09-12 NOTE — ASSESSMENT
[FreeTextEntry1] : 1) Type 2 DM:  Although the pt's A1c level remains distinctly above goal, it has clearly improved.  She does not think that her fingersticks are any lower than before, though her readings in the morning and after she awakens in the early evening are in fact lower.  She attributes whatever improvement has occurred to "eating better," which probably translates to less takeout food eaten at work.  She is still almost certainly hyperglycemic after the takeout food, however, (which she does not likely cover with insulin) and also after her breakfast.\par Suggestions at this point were as follows:\par --Needs to decrease to one piece of toast at breakfast if she is also having cereal, or consider doing a 30 min exercise walk after the meal.  (Either of these options would be better than increasing the pre-breakfast insulin).\par --Needs to measure her portion of rice at lunch and limit to at most 1 cup (3/4 cup would be preferable).  \par --Again strongly encouraged her to check a fingersticks when she awakens to urinate in the mid-afternoon, since this is the only way to assess the adequacy of her pre-lunch Humalog dose\par --Most importantly, I again pushed her to eat her main meal at home before she leaves for work, since this is the only way that she will be able to resist ordering and/or eating the takeout food.  (If she does this, she can bring a salad to eat on her break at 2 AM).  As an alternative, she can bring a meal to heat up at work, but she needs to cover this with insulin.\par \par Pt admits that she had obtained a Bernardo device but has not been using it.  She is unable to give me a good reason except that the sensor was visible and would provoke questions or identify her as a diabetic.\par \par 2) Hyperlipidemia:  Target LDL is < 70 mg% given her diabetes, HBP and evidence of coronary artery calcifications on CT scanning.  Her current LDL-cholesterol is well below goal, and is the lowest value she has achieved to date.  I suspect the improvement is due to better compliance with her medication regimen.\par --Continue combination therapy with atorvastatin and Zetia.\par \par 3) Hypothyroidism:  TSH level is in the optimal range of 0.4-2.5 uU/ml.\par --Continue levothyroxine 75 mcg/day\par \par 4) Hypertension:  BP is actually on the lower side of the normal range at today's visit.  Readings which she has checked at work seem to be in target range.\par --Continue combination therapy with enalapril, labetalol, nifedipine and HCTZ\par --She reports occasional missed doses of medication in the evening, but tends bring her pills with her to work (intending to take them when she gets there) and forgets to take them.  Suggested that she take her meds before leaving for work.\par \par 5) MIcroalbuminuria:  Current microalbumin ratio is within normal limits.\par --Continue enalapril and aggressive BP control\par \par See for follow-up in 3-4 months.  CMP, lipids, A1c, TFTs, fructosamine and 25-D before the visit\par \par Spent 40 min with the pt, > 50% on counseling regarding timing of meals, insulin, etc for her diabetes. [FreeTextEntry2] : Timing of meals, eating supper before leaving for work, etc

## 2020-09-12 NOTE — REVIEW OF SYSTEMS
[Fatigue] : no fatigue [Decreased Appetite] : appetite not decreased [Chills] : no chills [Dry Eyes] : no dryness [Fever] : no fever [Dysphagia] : no dysphagia [Blurred Vision] : no blurred vision [Eyes Itch] : no itch [Hearing Loss] : no hearing loss  [Chest Pain] : no chest pain [Palpitations] : no palpitations [Shortness Of Breath] : no shortness of breath [Lower Ext Edema] : no lower extremity edema [SOB on Exertion] : no shortness of breath on exertion [Wheezing] : no wheezing [Cough] : no cough [Nausea] : no nausea [Heartburn] : no heartburn [Dysuria] : no dysuria [Polyuria] : no polyuria [Diarrhea] : no diarrhea [Back Pain] : no back pain [Muscle Cramps] : no muscle cramps [Muscle Weakness] : no muscle weakness [Dry Skin] : no dry skin [Ulcer] : no ulcer [Tremors] : no tremors [Pain/Numbness of Digits] : no pain/numbness of digits [Difficulty Walking] : no difficulty walking [Headaches] : no headaches [Depression] : no depression [Insomnia] : no insomnia [Stress] : no stress [Cold Intolerance] : no cold intolerance [Heat Intolerance] : no heat intolerance [Polydipsia] : no polydipsia [Easy Bleeding] : no ~M tendency for easy bleeding [FreeTextEntry7] : Right-sided abdominal/flank discomfort as noted in the HPI.  Has been started on Linzess for constipation [FreeTextEntry2] : Has gained 5 lb since her last visit [Easy Bruising] : no tendency for easy bruising [FreeTextEntry9] : Moderate discomfort in both knees [FreeTextEntry8] : Nocturia X 1-2

## 2020-09-12 NOTE — HISTORY OF PRESENT ILLNESS
[FreeTextEntry1] : 64-year-old woman who is followed for insulin-requiring type 2 DM, hyperlipidemia, post-surgical hypothyroidism and hypertension.  Her diabetes was initially diagnosed in 2004, and she has been insulin-requiring since 2011.  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  Her disease has been complicated by significant non-proliferative retinopathy and by microalbuminuria.  She has also been hypothyroid since undergoing a sub-total thyroidectomy in 2007 for a large multinodular goiter with compressive symptoms.  Her other active medical problems include hepatic steatosis and obesity..\par \malcolm Has not had any acute illnesses since her last visit, but is still experiencing right-sided flank pain and generalized myalgias--(though the myalgias and arthralgias occur mostly when she walks up stairs, etc).  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  When asked why they are lower, she says "I'm eating better." Seems to be eating less of the takeout food ordered by the nurses, but is still having occasional pizza or other Italian food.  If she avoids this, her fingerstick in the morning will be closer to 130--if she does not, it will be be closer to "180."\par --Breakfast is hot cereal (1/2 cup) plus one or two slices of toast and fruit.  Does not check any fingersticks 2 hours after the meal, but she will usually be up to 180-220 before lunch.\par --Takes 30 units before lunch.  Starch at the meal is rice--"two spoons"--(does not measure it out in cups).  She then goes to sleep, but although she is usually up urinating at 4-5 PM she does not test a fingerstick until she gets up at 7 PM, and these are now down near 100 mg%.  When asked why they are now lower, says "I don't know."\malcolm --Does not eat another meal before leaving for work.  She will sometimes bring a salad with her, but also eats some of the takeout food ordered by the nurses.\par \malcolm Says that her BPs checked at work are "110-130/.'  Asked if a diastolic of 90 is OK, she says "it isn't?"  Admits that she will sometimes forget her evening doses of enalapril and labetalol.\malcolm Last ophth exam was approximately in June, and was stable.\malcolm Denies any numbness or paresthesias in her feet.\malcolm Is planning to retire at the end of November.

## 2020-09-12 NOTE — DATA REVIEWED
[FreeTextEntry1] : [facility]  (9/4/20)\par \par ,  A1c 8.5%, fructosamine 321\par CMP WNL\par LDL 44, HDL 34, TG 49\par Urine microalb ratio negative at 15  (normal < 30)\par TSH 1.04 uU/ml  (0.45-4.5)\par \par CT of abdomen (7/23/20):  \par 6 mm cyst in the mid-pancreas\par ?? 1 cm left adrenal adenoma\par Liver described as normal ???\par \par Echocardiogram  (9/9/20)\par \par Borderline LVH with mild diastolic dysfunction\par EF 55%\par

## 2020-09-12 NOTE — PHYSICAL EXAM
[Alert] : alert [Well Nourished] : well nourished [Healthy Appearance] : healthy appearance [EOMI] : extra ocular movement intact [PERRL] : pupils equal, round and reactive to light [No Proptosis] : no proptosis [No Lid Lag] : no lid lag [No Neck Mass] : no neck mass was observed [Normal Hearing] : hearing was normal [Normal Outer Ear/Nose] : the ears and nose were normal in appearance [No LAD] : no lymphadenopathy [Clear to Auscultation] : lungs were clear to auscultation bilaterally [No Murmurs] : no murmurs [Normal Rate] : heart rate was normal [Regular Rhythm] : with a regular rhythm [Carotids Normal] : carotid pulses were normal with no bruits [Soft] : abdomen soft [Normal Supraclavicular Nodes] : no supraclavicular lymphadenopathy [Normal Anterior Cervical Nodes] : no anterior cervical lymphadenopathy [No Involuntary Movements] : no involuntary movements were seen [Normal Gait] : normal gait [No CVA Tenderness] : no ~M costovertebral angle tenderness [No Rash] : no rash [No Joint Swelling] : no joint swelling seen [Normal Strength/Tone] : muscle strength and tone were normal [Normal Sensation on Monofilament Testing] : normal sensation on monofilament testing of lower extremities [No Tremors] : no tremors [Normal Affect] : the affect was normal [Normal Mood] : the mood was normal [Kyphosis] : no kyphosis present [Foot Ulcers] : no foot ulcers [de-identified] : Moderately obese [de-identified] : Mild conjunctival injection OU [de-identified] : Thyroidectomy scar.  No palpable thyroid tissue [de-identified] : 1+ edema of both lower legs.  DP pulses non-palpable bilaterally, but capillary refill is normal [de-identified] : Mild RUQ guarding to deep palpation.  Liver edge 3 FB below the RCM.  No areas of lipohypertrophy at injection sites [de-identified] : Mild acanthosis in the posterior cervical area [de-identified] : Vibratory sensation moderately decreased over the toes

## 2020-10-01 PROBLEM — I67.89 CEREBRAL MICROVASCULAR DISEASE: Status: ACTIVE | Noted: 2019-08-21

## 2020-12-11 ENCOUNTER — APPOINTMENT (OUTPATIENT)
Dept: ENDOCRINOLOGY | Facility: CLINIC | Age: 65
End: 2020-12-11
Payer: COMMERCIAL

## 2020-12-11 VITALS
OXYGEN SATURATION: 99 % | TEMPERATURE: 97.3 F | HEIGHT: 67 IN | SYSTOLIC BLOOD PRESSURE: 150 MMHG | HEART RATE: 88 BPM | BODY MASS INDEX: 31.55 KG/M2 | WEIGHT: 201 LBS | DIASTOLIC BLOOD PRESSURE: 90 MMHG

## 2020-12-11 VITALS
HEIGHT: 67 IN | HEART RATE: 88 BPM | SYSTOLIC BLOOD PRESSURE: 150 MMHG | TEMPERATURE: 97.3 F | WEIGHT: 201 LBS | OXYGEN SATURATION: 99 % | DIASTOLIC BLOOD PRESSURE: 90 MMHG | BODY MASS INDEX: 31.55 KG/M2

## 2020-12-11 DIAGNOSIS — Z80.0 FAMILY HISTORY OF MALIGNANT NEOPLASM OF DIGESTIVE ORGANS: ICD-10-CM

## 2020-12-11 PROCEDURE — 99215 OFFICE O/P EST HI 40 MIN: CPT

## 2020-12-11 PROCEDURE — 99072 ADDL SUPL MATRL&STAF TM PHE: CPT

## 2020-12-13 PROBLEM — Z80.0 FAMILY HISTORY OF COLON CANCER: Status: ACTIVE | Noted: 2020-12-13

## 2020-12-13 NOTE — PHYSICAL EXAM
[Alert] : alert [Well Nourished] : well nourished [Healthy Appearance] : healthy appearance [Normal Sclera/Conjunctiva] : normal sclera/conjunctiva [EOMI] : extra ocular movement intact [PERRL] : pupils equal, round and reactive to light [No Proptosis] : no proptosis [No Lid Lag] : no lid lag [Normal Outer Ear/Nose] : the ears and nose were normal in appearance [Normal Hearing] : hearing was normal [No Neck Mass] : no neck mass was observed [No LAD] : no lymphadenopathy [Clear to Auscultation] : lungs were clear to auscultation bilaterally [No Murmurs] : no murmurs [Normal Rate] : heart rate was normal [Regular Rhythm] : with a regular rhythm [Carotids Normal] : carotid pulses were normal with no bruits [No Edema] : no peripheral edema [Not Tender] : non-tender [Soft] : abdomen soft [No HSM] : no hepato-splenomegaly [Normal Supraclavicular Nodes] : no supraclavicular lymphadenopathy [Normal Anterior Cervical Nodes] : no anterior cervical lymphadenopathy [No CVA Tenderness] : no ~M costovertebral angle tenderness [Normal Gait] : normal gait [No Involuntary Movements] : no involuntary movements were seen [No Joint Swelling] : no joint swelling seen [Normal Strength/Tone] : muscle strength and tone were normal [No Rash] : no rash [No Tremors] : no tremors [Normal Sensation on Monofilament Testing] : normal sensation on monofilament testing of lower extremities [Normal Affect] : the affect was normal [Normal Mood] : the mood was normal [Kyphosis] : no kyphosis present [Abdominal Striae] : no abdominal striae [Foot Ulcers] : no foot ulcers [de-identified] : Mildly obese [de-identified] : Thyroidectomy scar.  No palpable thyroid tissue [de-identified] : DP pulses 2+ on the left, non-palpable (but with normal capillary refill) on the right [de-identified] : No areas of lipohypertrophy from insulin injections.  (Pt appears to be rotating sites) [de-identified] : Minimal acanthosis in the posterior cervical area [de-identified] : Vibratory sensation significantly decreased over the toes

## 2020-12-13 NOTE — ASSESSMENT
[FreeTextEntry1] : 1) Type 2 DM: Glycemic control remains poor, though her A1c level is actually lower than most of her usual values, and her fingersticks during the past week are mostly below 200 mg%.  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  She still needs more frequent post-prandial testing, but this may be easier when she is on a regular schedule and no longer eating just before going to sleep.\par --For now, to continue her current regimen.\par --Again discussed a Bernardo device, but she will wait until she is on new insurance after long-term to check on this.\par --Diet reinforced--needs to be sure the cereal at breakfast is limited to 1/2 cup.\par \par 2) Hyperlipidemia: LDL-cholesterol is now below her target of 70 mg%.  She has likely been more compliant with her medication regimen.\par --Continue atorvastatin 80 mg/day plus Zetia\par \par 3) Hypothyroidism:  TSH level is in the optimal range of 0.4-2.5 uU/ml.  \par --Continue levothyroxine 75 mcg/day\par \par 4) Hypertension: BP was moderately elevated at today's visit, but the pt says that this was the result of her rushing to get here.  Her readings at work, (which she says are checked daily), are nearly all in target range.  She is unable to explain the fluctuations or the occasional borderline low values, but I suspect that these are due either to variations in her salt intake (art if she has eaten takeout food ordered by the nurses at work), or due to medication non-compliance.  \par --Continue combination therapy with enalapril, nifedipine, HCTZ and labetalol.\par --Continue monitoring BPs at work\par --Salt restriction emphasized\par \par See for follow-up in 3-4 months.  CMP, lipids, A1c, TFTs, CBC, Fe/IBC, microalb before the visit\par \par Spent 40 min with the pt, > 50% on counseling re: diet, meal schedule, post-prandial monitoring at targets, etc [FreeTextEntry2] : Diet, post-prandial FS monitoring

## 2020-12-13 NOTE — HISTORY OF PRESENT ILLNESS
[FreeTextEntry1] : 65-year-old woman who is followed for insulin-requiring type 2 DM, hyperlipidemia, post-surgical hypothyroidism and hypertension.  Her diabetes was initially diagnosed in 2004, and she has been insulin-requiring since 2011.  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  Her disease has been complicated by significant non-proliferative retinopathy and by microalbuminuria.  She has also been hypothyroid since undergoing a sub-total thyroidectomy in 2007 for a large multinodular goiter with compressive symptoms.  Her other active medical problems include hepatic steatosis and obesity..\par \malcolm Will be retiring from work at the end of this month..\malcolm Was referred to a cardiologist for a "general" evaluation by her PCP.  The cardiologist told her than she might have CHF.  Tried to have her do a stress test, but she was unable to tolerate the treadmill.  Eventually had a pharmacologic test which was apparently negative,  \malcolm BP in the cardiologist's office was somewhat low at 111/62, and he told her that she was on "too much medication."  Her readings at work are rather variable--most of the systolics are < 140, but will occasionally be as low as 100.  Diastolics can range anywhere from 60 to 100, and she cannot explain the fluctuations.\malcolm Still taking 50 units of Lantus, but has switched to taking it in the evening because she has started to go to sleep after eating breakfast.  Takes 28-30 units of Humalog before all three meals.\malcolm Brought a log of her fingersticks for the past 2 weeks.  Has been testing twice a day--in the morning when she gets home from work, and in the late afternoon when she wakes up.  Most of the readings are in the 110-200 range.  She says that she is unable to explain the fluctuations within this range.\malcolm In terms of her current diet:.\malcolm --Still having half a cup of cereal plus 2 pieces toast for breakfast, sometimes with a piece of fruit.  (Will occasionally have the fruit in the mid-morning instead).\malcolm --Eats her second (and main) meal in the late afternoon--protein (chicken or fish), rice (or pasta, potato) and vegetables.  \malcolm --Brings a salad to work to have as her third meal.  Will not usually take insulin to cover this.  Admits to partaking of the takeout food ordered by the nurses 3 times a week.\malcolm Has not had any hypoglycemic episodes at work\malcolm Last ophth exam was in July--stable NPDR.\malcolm Brother was recently diagnosed with metastatic colon CA\malcolm Was started on FeSO4 by her PCP for anemia.\malcolm Denies any numbness or paresthesias in her feet.

## 2020-12-13 NOTE — REVIEW OF SYSTEMS
[Fatigue] : fatigue [Decreased Appetite] : appetite not decreased [Recent Weight Gain (___ Lbs)] : no recent weight gain [Recent Weight Loss (___ Lbs)] : no recent weight loss [Fever] : no fever [Chills] : no chills [Dry Eyes] : no dryness [Blurred Vision] : no blurred vision [Eyes Itch] : no itch [Dysphagia] : no dysphagia [Hearing Loss] : no hearing loss  [Chest Pain] : no chest pain [Palpitations] : no palpitations [Lower Ext Edema] : no lower extremity edema [Shortness Of Breath] : no shortness of breath [Cough] : no cough [Wheezing] : no wheezing [SOB on Exertion] : no shortness of breath on exertion [Nausea] : no nausea [Constipation] : no constipation [Heartburn] : no heartburn [Diarrhea] : no diarrhea [Polyuria] : no polyuria [Dysuria] : no dysuria [Joint Pain] : no joint pain [Muscle Weakness] : no muscle weakness [Myalgia] : no myalgia  [Joint Stiffness] : no joint stiffness [Dry Skin] : no dry skin [Ulcer] : no ulcer [Headaches] : no headaches [Difficulty Walking] : no difficulty walking [Tremors] : no tremors [Pain/Numbness of Digits] : no pain/numbness of digits [Depression] : no depression [Insomnia] : no insomnia [Stress] : no stress [Polydipsia] : no polydipsia [Cold Intolerance] : no cold intolerance [Heat Intolerance] : no heat intolerance [Easy Bleeding] : no ~M tendency for easy bleeding [Easy Bruising] : no tendency for easy bruising [FreeTextEntry4] : Still has occasional tinnitus.  Is having a wisdom tooth extracted today [FreeTextEntry8] : Urinary frequency has markedly decreased, and has nocturia only once a day

## 2020-12-13 NOTE — DATA REVIEWED
[FreeTextEntry1] : [facility]  (12/7/20)\par \par ,  A1c 9.2%, fructosamine 350\par CMP WNL\par LDL 63, HDL 36, TG 45\par TSH 0.52 uU/ml  (0.45-4.5)\par 25-D level excellent at 51.4 ng/ml

## 2021-03-12 ENCOUNTER — APPOINTMENT (OUTPATIENT)
Dept: ENDOCRINOLOGY | Facility: CLINIC | Age: 66
End: 2021-03-12
Payer: MEDICARE

## 2021-03-12 VITALS
WEIGHT: 212 LBS | HEART RATE: 75 BPM | DIASTOLIC BLOOD PRESSURE: 71 MMHG | SYSTOLIC BLOOD PRESSURE: 169 MMHG | HEIGHT: 67 IN | BODY MASS INDEX: 33.27 KG/M2 | TEMPERATURE: 96.7 F | OXYGEN SATURATION: 98 %

## 2021-03-12 PROCEDURE — 99215 OFFICE O/P EST HI 40 MIN: CPT

## 2021-03-12 PROCEDURE — 99072 ADDL SUPL MATRL&STAF TM PHE: CPT

## 2021-03-13 NOTE — HISTORY OF PRESENT ILLNESS
[FreeTextEntry1] : 65-year-old woman who is followed for insulin-requiring type 2 DM, hyperlipidemia, post-surgical hypothyroidism and hypertension.  Her diabetes was initially diagnosed in , and she has been insulin-requiring since .  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  Her disease has been complicated by significant non-proliferative retinopathy and by microalbuminuria.  She has also been hypothyroid since undergoing a sub-total thyroidectomy in  for a large multinodular goiter with compressive symptoms.  Her other active medical problems include hepatic steatosis and obesity..\par \malcolm Retired from work at the end of last year, but says that she is bored staying at home--"There's nothing to do."\par Her brother with metastatic colon CA  a few months ago.  Another brother has stage 4 prostate CA.\malcolm Has been having a lot of trouble getting back on a daytime schedule.  Says that she is unable to sleep at night, and ends up sleeping during the day.  Tries to go to sleep at 10 PM, but lies awake most of the night, finally falls asleep at 4-5 AM, sleeps until 10 AM.  She will then sleep another 5 hours between 2 PM and 7 PM\par Says that she has had "a lot of high sugars." She also gained approximately 10 lb in weight.  \par Ophth exam in January was stable.\par Saw her PCP, who told her that she was on "too many medications"--(mostly referring to her anti-hypertensive meds).  \par BPs at home are actually above target > 50% of the time.  Many of the systolics are in the 140-160 range, diastolics 85-95, with one reading of 160/100\par Diet and fingerstick monitoring:\par --Pre-breakfast readings are mostly 160-170, but occasionally over 200.  She is vague as to whether the spikes are due to diet lapses the night before\par --Eats breakfast at 10 AM--usually just oatmeal and coffee.  Takes 28 units of Humalog for the meal\par --If she tests a fingerstick after breakfast, it will usually be in the mid-100 range.\par --She will often skip lunch, but go to sleep at 2 PM as noted above.\par --Eats supper with her sister at 7-8 PM.  Most of those fingersticks are also in the 150-190 range.\par --Protein at supper is chicken or fish.  Starch is usually white rice ("two spoonfuls") or potato.\par --Admits to eating cake and cookies in the evening.  (When asked whether it is her or her sister who brings them into the house, she says "I do").

## 2021-03-13 NOTE — DATA REVIEWED
[FreeTextEntry1] : [facility]  (3/8/21)\par \par ,  A1c 9.6%\par CMP WNL except borderline elevated alk phos of 122 U/l\par CBC WNL, Fe sat 35%\par LDL 60, HDL 34, TG 50\par TSH 1.13 uU/ml  (0.45-4.5)\par Urine microalbumin ratio elevated at 73  (normal < 30

## 2021-03-13 NOTE — REVIEW OF SYSTEMS
[Fatigue] : fatigue [Dry Skin] : dry skin [Hair Loss] : hair loss [Decreased Appetite] : appetite not decreased [Fever] : no fever [Chills] : no chills [Dry Eyes] : no dryness [Blurred Vision] : no blurred vision [Eyes Itch] : no itch [Dysphagia] : no dysphagia [Hearing Loss] : no hearing loss  [Chest Pain] : no chest pain [Palpitations] : no palpitations [Lower Ext Edema] : no lower extremity edema [Shortness Of Breath] : no shortness of breath [Cough] : no cough [Wheezing] : no wheezing [SOB on Exertion] : no shortness of breath on exertion [Nausea] : no nausea [Heartburn] : no heartburn [Diarrhea] : no diarrhea [Polyuria] : no polyuria [Dysuria] : no dysuria [Nocturia] : no nocturia [Muscle Weakness] : no muscle weakness [Myalgia] : no myalgia  [Ulcer] : no ulcer [Headaches] : no headaches [Difficulty Walking] : no difficulty walking [Tremors] : no tremors [Pain/Numbness of Digits] : no pain/numbness of digits [Depression] : no depression [Anxiety] : no anxiety [Stress] : no stress [Polydipsia] : no polydipsia [Cold Intolerance] : no cold intolerance [Heat Intolerance] : no heat intolerance [Easy Bleeding] : no ~M tendency for easy bleeding [Easy Bruising] : no tendency for easy bruising [FreeTextEntry2] : Has gained 11 lb since her last visit [FreeTextEntry7] : Taking Linzess for constipation.  Still gets occasional epigastric pain and fullness [FreeTextEntry9] : Discomfort and stiffness in her knees [de-identified] : See comments in the HPI re: sleep disturbance

## 2021-03-13 NOTE — PHYSICAL EXAM
[Alert] : alert [Healthy Appearance] : healthy appearance [Normal Sclera/Conjunctiva] : normal sclera/conjunctiva [EOMI] : extra ocular movement intact [No Proptosis] : no proptosis [No Lid Lag] : no lid lag [Normal Outer Ear/Nose] : the ears and nose were normal in appearance [Normal Hearing] : hearing was normal [No Neck Mass] : no neck mass was observed [No LAD] : no lymphadenopathy [Clear to Auscultation] : lungs were clear to auscultation bilaterally [No Murmurs] : no murmurs [Normal Rate] : heart rate was normal [Regular Rhythm] : with a regular rhythm [Carotids Normal] : carotid pulses were normal with no bruits [No Edema] : no peripheral edema [Not Tender] : non-tender [Soft] : abdomen soft [Normal Supraclavicular Nodes] : no supraclavicular lymphadenopathy [Normal Anterior Cervical Nodes] : no anterior cervical lymphadenopathy [No CVA Tenderness] : no ~M costovertebral angle tenderness [Normal Gait] : normal gait [No Involuntary Movements] : no involuntary movements were seen [No Joint Swelling] : no joint swelling seen [Normal Strength/Tone] : muscle strength and tone were normal [No Rash] : no rash [No Tremors] : no tremors [Normal Sensation on Monofilament Testing] : normal sensation on monofilament testing of lower extremities [Normal Affect] : the affect was normal [Normal Mood] : the mood was normal [Kyphosis] : no kyphosis present [Foot Ulcers] : no foot ulcers [de-identified] : Moderately obese [de-identified] : Pupils miotic.  No corneal arcus [de-identified] : Thyroidectomy scar.  No palpable thyroid tissue [de-identified] : DP pulses non-palpable bilaterally, but capillary refill is normal [de-identified] : Liver edge 3 FB below the RCM [de-identified] : Mild acanthosis nigricans in the posterior cervical area [de-identified] : Vibratory sensation significantly decreased over the toes

## 2021-03-13 NOTE — ASSESSMENT
[FreeTextEntry1] : 1) Type 2 DM:  Glycemic control remains poor despite her having retired and now having the opportunity for a more regular schedule and better control of her meals.  Her compliance with the insulin regimen has likely improved, but her control has been sabotaged by her intake of cake, cookies, etc.\par Diet was discussed in detail:\par --Needs to start measuring out 1 cup of oatmeal in the morning at breakfast\par --Needs to start eating a small lunch--even if only half a sandwich.  Can limit her Humalog dose to 20 units for this\par --Portions of starches at supper discussed, as well as the need to change to brown rice and sweet potatoes form her current "white-flour" alternatives\par --Obviously needs to eliminate the snacking on baked goods at night\par Needs to start a walking program of 30-60 min/day\par --Can continue her current insulin regimen for now\par --Test fingersticks 4Xday AC and hs\par --Discussed use of a Free Style Bernardo \par \par 2) Hyperlipidemia:  LDL-cholesterol target is 70 mg% given her multiple risk factors and the presence of coronary artery calcification on CT scanning.  Her current LDL level is therefore at goal.  She has presumably been much more compliant with her medication regimen.\par --Continue combination therapy with atorvastatin 80 mg/day plus Zetia\par \par 3) Hypothyroidism:  TSH level is in the optimal range of 0.4-2.5 uU/ml\par --Continue levothyroxine 75 mcg/day\par \par 4) Hypertension:  Her systolic BP is significantly elevated at today's visit, and has also been frequently elevated at home.  She insists that she has been taking the HCTZ, but has stopped the labetalol per the instructions of her cardiologist.  Optimal BP control is critical for her given her microalbuminuria, evidence of CNS microvascular disease, and previous CVA.\par --To restart the labetalol at 100 mg BID.\par --Continue HCTZ, enalapril and nifedipine\par --Continue daily monitoring with target of < 140/85 (ideally < 130/80) and check in with me in 2-3 weeks regarding her progress\par \par 5) MIcroalbuminuria:  Ratio is only moderately positive but almost certainly reflects diabetic nephropathy.\par --Emphasized the potential implications of the microalbuminuria to the pt, and the importance of both optimal BP control and improved glycemic control.\par --She is already on a maximal dose of enalapril\par \par 6) Insomnia:  Emphasized to the pt that she stands no chance of reverting to a nighttime sleep schedule while she sleeps for 5 hours during the day, and also sleeps until 10 AM in the morning.\par --Told her that she needs to force herself to avoid sleeping during the day.  Explained that this would be difficult but that she could accomplish it if she stayed active during the day with exercise walks, housework, etc\par --She will try melatonin (starting at 1 mg qhs) as a sleep aid.\par --If this fails to work, alternative would be to try having her move her sleep schedule back one hour at a time\par \par 7) Colon polyps:  Given her history of polyps and her brother's colon CA, is due for follow-up colonoscopy\par \par See for follow-up in 3 months.  CMP, lipids, A1c, miocroalb, TFTs before the visit.\par \par Spent 45 min with the pt, dealing with strategies for handling her diet issues, as well as BP targets, strategies for insomnia [FreeTextEntry2] : Diet, BP targets, sleep hygiene

## 2021-06-11 ENCOUNTER — APPOINTMENT (OUTPATIENT)
Dept: ENDOCRINOLOGY | Facility: CLINIC | Age: 66
End: 2021-06-11
Payer: MEDICARE

## 2021-06-11 VITALS
WEIGHT: 212 LBS | BODY MASS INDEX: 33.27 KG/M2 | OXYGEN SATURATION: 99 % | HEIGHT: 67 IN | SYSTOLIC BLOOD PRESSURE: 144 MMHG | HEART RATE: 71 BPM | DIASTOLIC BLOOD PRESSURE: 87 MMHG | TEMPERATURE: 98 F

## 2021-06-11 DIAGNOSIS — G47.00 INSOMNIA, UNSPECIFIED: ICD-10-CM

## 2021-06-11 DIAGNOSIS — K59.00 CONSTIPATION, UNSPECIFIED: ICD-10-CM

## 2021-06-11 PROCEDURE — 99215 OFFICE O/P EST HI 40 MIN: CPT

## 2021-06-13 NOTE — ASSESSMENT
[FreeTextEntry1] : 1) Type 2 DM:\par --To continue the present insulin regimen, but take insulin pre-lunch.  \par --Change the Lantus to Semglee (generic glargine)\par --Diet discussed.  To change the bagel to 1 slice of whole-grain bread at breakfast. limit the rice at supper to one cup, and change to brown rice (for both glycemic control and her constipation).\par \par 2) Hyperlipidemia:\par --To contintue the atorvastatin 80 mg/day, but stop the Zetia for now given her need to buy all of her meds\par

## 2021-08-11 RX ORDER — PEN NEEDLE, DIABETIC 29 G X1/2"
31G X 5 MM NEEDLE, DISPOSABLE MISCELLANEOUS
Qty: 400 | Refills: 3 | Status: ACTIVE | COMMUNITY
Start: 2018-01-14 | End: 1900-01-01

## 2021-08-11 RX ORDER — BLOOD SUGAR DIAGNOSTIC
STRIP MISCELLANEOUS
Qty: 400 | Refills: 3 | Status: ACTIVE | COMMUNITY
Start: 2018-01-14 | End: 1900-01-01

## 2021-09-15 ENCOUNTER — APPOINTMENT (OUTPATIENT)
Dept: ENDOCRINOLOGY | Facility: CLINIC | Age: 66
End: 2021-09-15
Payer: COMMERCIAL

## 2021-09-15 VITALS
BODY MASS INDEX: 33.27 KG/M2 | OXYGEN SATURATION: 97 % | WEIGHT: 212 LBS | SYSTOLIC BLOOD PRESSURE: 103 MMHG | TEMPERATURE: 97.3 F | DIASTOLIC BLOOD PRESSURE: 66 MMHG | HEIGHT: 67 IN | HEART RATE: 70 BPM

## 2021-09-15 PROCEDURE — 99214 OFFICE O/P EST MOD 30 MIN: CPT

## 2021-09-15 RX ORDER — LINACLOTIDE 72 UG/1
72 CAPSULE, GELATIN COATED ORAL
Refills: 0 | Status: DISCONTINUED | COMMUNITY
Start: 2020-09-12 | End: 2021-09-15

## 2021-09-18 NOTE — DATA REVIEWED
[FreeTextEntry1] : [facility] (6/4/20)\par \par ,  A1c 9.5%\par CMP otherwise WNL\par LDL 73, HDL 45, TG 58\par Urine microalb ratio borderline positive at 30\par TSH 1.45 uU/ml  (0.45-4.5)\par 25-D level excellent at 52.9 ng/ml  (Did this help the pt during her COVID infection?)
No

## 2021-09-19 NOTE — REVIEW OF SYSTEMS
[Fatigue] : fatigue [Dry Skin] : dry skin [Hair Loss] : hair loss [Decreased Appetite] : appetite not decreased [Chills] : no chills [Fever] : no fever [Dry Eyes] : no dryness [Blurred Vision] : no blurred vision [Eyes Itch] : no itch [Dysphagia] : no dysphagia [Hearing Loss] : no hearing loss  [Chest Pain] : no chest pain [Lower Ext Edema] : no lower extremity edema [Palpitations] : no palpitations [Shortness Of Breath] : no shortness of breath [Cough] : no cough [Wheezing] : no wheezing [SOB on Exertion] : no shortness of breath on exertion [Nausea] : no nausea [Heartburn] : no heartburn [Diarrhea] : no diarrhea [Polyuria] : no polyuria [Dysuria] : no dysuria [Nocturia] : no nocturia [Muscle Weakness] : no muscle weakness [Myalgia] : no myalgia  [Ulcer] : no ulcer [Headaches] : no headaches [Difficulty Walking] : no difficulty walking [Tremors] : no tremors [Pain/Numbness of Digits] : no pain/numbness of digits [Depression] : no depression [Anxiety] : no anxiety [Stress] : no stress [Polydipsia] : no polydipsia [Cold Intolerance] : no cold intolerance [Heat Intolerance] : no heat intolerance [Easy Bleeding] : no ~M tendency for easy bleeding [Easy Bruising] : no tendency for easy bruising [FreeTextEntry2] : Has gained 11 lb since her last visit [FreeTextEntry7] : Taking Linzess for constipation.  Still gets occasional epigastric pain and fullness [FreeTextEntry9] : Discomfort and stiffness in her knees [de-identified] : See comments in the HPI re: sleep disturbance

## 2021-09-19 NOTE — HISTORY OF PRESENT ILLNESS
[FreeTextEntry1] : 65-year-old woman who is followed for insulin-requiring type 2 DM, hyperlipidemia, post-surgical hypothyroidism and hypertension.  Her diabetes was initially diagnosed in 2004, and she has been insulin-requiring since 2011.  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  Her disease has been complicated by significant non-proliferative retinopathy and by microalbuminuria.  She has also been hypothyroid since undergoing a sub-total thyroidectomy in 2007 for a large multinodular goiter with compressive symptoms.  Her other active medical problems include hepatic steatosis and obesity..\par \malcolm Has not had any acute illnesses or significant medical issues since her last visit.\par Straightened out the problems with her insurance.  (Now has straight Medicare with a Medi-gap policy)\malcolm Says that her blood sugars have been better--"I'm trying"\par Has changed from the Semglee back to Lantus--taking 50 units qhs\par Taking 28-30 units of Humalog pre-meal.\par Testing fingersticks 4X/day but forgot to bring her written log.\par --Pre-breakfast readings have been generally < 140, and in the  range at least twice a week.  Has not had any overnight hypoglycemia, nor any pre-breakfast values < 70 mg%\par --Breakfast is still hot cereal every morning.  Does not put any sweetener or fruit in the cereal, but has half a banana in the mid-morning.  Denies any hypoglycemic symptoms between breakfast and lunch\par --Pre-lunch readings are "120-130"\par --Lunch is usually the main meal of the day.  Starch is most often brown rice (which she does not measure but thinks is no more than one cup).  Has "a lot of vegetables" with the meal, but these usually include carrots.\par --Pre-supper fingersticks are usually mildly elevated at approximately 180 mg%.\par --Supper is either leftovers from lunch or a sandwich.\par --Has not been testing after supper or at bedtime\par --Has not been snacking at night\malcolm Is not exercising at all.  (Says that she tried to go walking with a few of her neighbors--one age 74, one 83--and says "I couldn't keep up with them"\par BPs checked at home are "fluctuating" --she is unable to say how frequently they are over 140/85.  Says that the readings tend to be higher later in the morning.  Has not been taking the nifedipine, however, because it lapsed during the interval when she was having to pay for her meds\par Is overdue (by a few months) for her ophth exam\par Denies any numbness or paresthesias in her feet.\par \par \par \par

## 2021-09-19 NOTE — PHYSICAL EXAM
[Alert] : alert [Healthy Appearance] : healthy appearance [Normal Sclera/Conjunctiva] : normal sclera/conjunctiva [EOMI] : extra ocular movement intact [No Proptosis] : no proptosis [No Lid Lag] : no lid lag [Normal Outer Ear/Nose] : the ears and nose were normal in appearance [Normal Hearing] : hearing was normal [No Neck Mass] : no neck mass was observed [No LAD] : no lymphadenopathy [Clear to Auscultation] : lungs were clear to auscultation bilaterally [No Murmurs] : no murmurs [Normal Rate] : heart rate was normal [Regular Rhythm] : with a regular rhythm [Carotids Normal] : carotid pulses were normal with no bruits [No Edema] : no peripheral edema [Not Tender] : non-tender [Soft] : abdomen soft [Normal Supraclavicular Nodes] : no supraclavicular lymphadenopathy [Normal Anterior Cervical Nodes] : no anterior cervical lymphadenopathy [No CVA Tenderness] : no ~M costovertebral angle tenderness [Normal Gait] : normal gait [No Involuntary Movements] : no involuntary movements were seen [No Joint Swelling] : no joint swelling seen [Normal Strength/Tone] : muscle strength and tone were normal [No Rash] : no rash [No Tremors] : no tremors [Normal Sensation on Monofilament Testing] : normal sensation on monofilament testing of lower extremities [Normal Affect] : the affect was normal [Normal Mood] : the mood was normal [Kyphosis] : no kyphosis present [Foot Ulcers] : no foot ulcers [de-identified] : Moderately obese [de-identified] : Pupils miotic.  No corneal arcus [de-identified] : Thyroidectomy scar.  No palpable thyroid tissue [de-identified] : DP pulses non-palpable bilaterally, but capillary refill is normal [de-identified] : Liver edge 3 FB below the RCM [de-identified] : Mild acanthosis nigricans in the posterior cervical area [de-identified] : Vibratory sensation significantly decreased over the toes

## 2022-01-13 ENCOUNTER — APPOINTMENT (OUTPATIENT)
Dept: ENDOCRINOLOGY | Facility: CLINIC | Age: 67
End: 2022-01-13
Payer: COMMERCIAL

## 2022-01-13 VITALS
BODY MASS INDEX: 32.18 KG/M2 | WEIGHT: 205 LBS | OXYGEN SATURATION: 98 % | SYSTOLIC BLOOD PRESSURE: 104 MMHG | HEIGHT: 67 IN | HEART RATE: 70 BPM | DIASTOLIC BLOOD PRESSURE: 62 MMHG | TEMPERATURE: 97.3 F

## 2022-01-13 PROCEDURE — 99214 OFFICE O/P EST MOD 30 MIN: CPT

## 2022-01-17 NOTE — HISTORY OF PRESENT ILLNESS
[FreeTextEntry1] : 65-year-old woman who is followed for insulin-requiring type 2 DM, hyperlipidemia, post-surgical hypothyroidism and hypertension.  Her diabetes was initially diagnosed in 2004, and she has been insulin-requiring since 2011.  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  Her disease has been complicated by significant non-proliferative retinopathy and by microalbuminuria.  She has also been hypothyroid since undergoing a sub-total thyroidectomy in 2007 for a large multinodular goiter with compressive symptoms.  Her other active medical problems include hepatic steatosis and obesity..\par \malcolm Has not had any acute illnesses since her last visit\par \malcolm Is now on a "regular" day/night schedule.  Goes to sleep at 11 PM, does not usually awaken until 9 AM.\par --Is taking 50 units of Lantus qhs, but is now seeing pre-breakfast fingersticks which are in the  range, occasionally in the 60s.\par -Taking 28 units of Humalog for breakfast, but has been having a full bagel for the meal.  The few glucoses which she has checked before lunch have been in the mid-100 range, but these are tested 4-5 hours after the meal\par --Takes 28 units for lunch as well, but recently has been having only a salad with some type of protein (tuna, hard-boied egg, etc) without any starch.  Despite this, has not been hypoglycemic in the afternoon\par --Supper is at 6-7 PM.  Starch is 1/2 cup of rice plus "a slice of potato"\par Fingersticks before lunch and supper are mostly in the mid-100 range.\par She has not been testing fingersticks at bedtime.\par

## 2022-01-17 NOTE — REVIEW OF SYSTEMS
[Fatigue] : fatigue [Dry Skin] : dry skin [Hair Loss] : hair loss [Decreased Appetite] : appetite not decreased [Fever] : no fever [Chills] : no chills [Dry Eyes] : no dryness [Blurred Vision] : no blurred vision [Eyes Itch] : no itch [Dysphagia] : no dysphagia [Hearing Loss] : no hearing loss  [Chest Pain] : no chest pain [Palpitations] : no palpitations [Lower Ext Edema] : no lower extremity edema [Shortness Of Breath] : no shortness of breath [Cough] : no cough [Wheezing] : no wheezing [SOB on Exertion] : no shortness of breath on exertion [Nausea] : no nausea [Heartburn] : no heartburn [Diarrhea] : no diarrhea [Polyuria] : no polyuria [Dysuria] : no dysuria [Nocturia] : no nocturia [Muscle Weakness] : no muscle weakness [Myalgia] : no myalgia  [Ulcer] : no ulcer [Headaches] : no headaches [Difficulty Walking] : no difficulty walking [Tremors] : no tremors [Pain/Numbness of Digits] : no pain/numbness of digits [Depression] : no depression [Anxiety] : no anxiety [Stress] : no stress [Polydipsia] : no polydipsia [Cold Intolerance] : no cold intolerance [Heat Intolerance] : no heat intolerance [Easy Bleeding] : no ~M tendency for easy bleeding [Easy Bruising] : no tendency for easy bruising [FreeTextEntry2] : Has gained 11 lb since her last visit [FreeTextEntry7] : Taking Linzess for constipation.  Still gets occasional epigastric pain and fullness [FreeTextEntry9] : Discomfort and stiffness in her knees [de-identified] : See comments in the HPI re: sleep disturbance

## 2022-01-17 NOTE — PHYSICAL EXAM
[Alert] : alert [Healthy Appearance] : healthy appearance [Normal Sclera/Conjunctiva] : normal sclera/conjunctiva [EOMI] : extra ocular movement intact [No Proptosis] : no proptosis [No Lid Lag] : no lid lag [Normal Outer Ear/Nose] : the ears and nose were normal in appearance [Normal Hearing] : hearing was normal [No Neck Mass] : no neck mass was observed [No LAD] : no lymphadenopathy [Clear to Auscultation] : lungs were clear to auscultation bilaterally [No Murmurs] : no murmurs [Normal Rate] : heart rate was normal [Regular Rhythm] : with a regular rhythm [Carotids Normal] : carotid pulses were normal with no bruits [No Edema] : no peripheral edema [Not Tender] : non-tender [Soft] : abdomen soft [Normal Supraclavicular Nodes] : no supraclavicular lymphadenopathy [Normal Anterior Cervical Nodes] : no anterior cervical lymphadenopathy [No CVA Tenderness] : no ~M costovertebral angle tenderness [Normal Gait] : normal gait [No Involuntary Movements] : no involuntary movements were seen [No Joint Swelling] : no joint swelling seen [Normal Strength/Tone] : muscle strength and tone were normal [No Rash] : no rash [No Tremors] : no tremors [Normal Sensation on Monofilament Testing] : normal sensation on monofilament testing of lower extremities [Normal Affect] : the affect was normal [Normal Mood] : the mood was normal [Kyphosis] : no kyphosis present [Foot Ulcers] : no foot ulcers [de-identified] : Moderately obese [de-identified] : Pupils miotic.  No corneal arcus [de-identified] : Thyroidectomy scar.  No palpable thyroid tissue [de-identified] : DP pulses non-palpable bilaterally, but capillary refill is normal [de-identified] : Liver edge 3 FB below the RCM [de-identified] : Mild acanthosis nigricans in the posterior cervical area [de-identified] : Vibratory sensation significantly decreased over the toes

## 2022-04-13 ENCOUNTER — APPOINTMENT (OUTPATIENT)
Dept: ENDOCRINOLOGY | Facility: CLINIC | Age: 67
End: 2022-04-13

## 2022-04-15 NOTE — HISTORY OF PRESENT ILLNESS
[FreeTextEntry1] : 63-year-old woman who is followed for insulin-requiring type 2 DM, hyperlipidemia, post-surgical hypothyroidism and hypertension.  Her diabetes was initially diagnosed in 2004, and she has been insulin-requiring since 2011.  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  Her disease has been complicated by significant non-proliferative retinopathy and by microalbuminuria.  She has also been hypothyroid since undergoing a sub-total thyroidectomy for a large multinodular goiter with compressive symptoms in 2007.  Her other active medical problems include hepatic steatosis and obesity..\par \par Pt brought a list of the medications which she was given after her hospitalization in March.  She appears to have been started on amlodipine in the hospital, but she is now taking both this plus nifedipine.  She is also not taking either Zetia or metoprolol, though the latter was probably stopped because of her transient bradycardia.\par BPs checked by the pt at home (and also occasionally at work) have been mostly < 130/80.\par Her only new complaints are increased lower extremity edema and tinnitus in her left ear.  (She denies any associated hearing deficit in the left ear or any vertigo).\par She is still taking 55 units of Lantus at midday, before she goes to sleep, and 22 units of Humalog before breakfast and her midday meal..  \par Fingersticks before breakfast are usually in the high 100 range.  Her breakfast now consists of one cup of oatmeal plus either two slices of bread or half a bagel.  \par She does not test any fingersticks after breakfast, but says that her blood sugar will go "low" at some point before lunch.  If she does not become hypoglycemic, her fingerstick before lunch will still be below 140.  \par Lunch is her main meal of the day--protein (chicken or fish) plus vegetables and starch (rice or pasta--"1 spoonful").  She is still not checking a fingerstick 2-3 hours after this meal--(i.e. when she gets up to urinate at 3-4 PM).\par Fingerstick when she wakes up at 8-9 PM is usually over 200.  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  She does not take any insulin before this meal, and says that she is unable to test 2 hours after the meal\malcolm Is not due for follow-up with her ophthalmologist until October.\par She denies any numbness or paresthesias in her feet.\par Brought a copy of her MRI reports:\par --Brain MRI showed only microvascular changes\par --MRA was negative for any significant carotid or vertebral artery stenoses
show

## 2022-07-05 NOTE — PHYSICAL EXAM
[Alert] : alert [No Acute Distress] : no acute distress [Normal Sclera/Conjunctiva] : normal sclera/conjunctiva [PERRL] : pupils equal, round and reactive to light [EOMI] : extra ocular movement intact [No Proptosis] : no proptosis [No Lid Lag] : no lid lag [Normal Outer Ear/Nose] : the ears and nose were normal in appearance [Normal Hearing] : hearing was normal [No Neck Mass] : no neck mass was observed [No LAD] : no lymphadenopathy [Clear to Auscultation] : lungs were clear to auscultation bilaterally [Normal Rate] : heart rate was normal  [Regular Rhythm] : with a regular rhythm [Carotids Normal] : carotid pulses were normal with no bruits [Not Tender] : non-tender [Soft] : abdomen soft [Normal] : normal and non tender [No CVA Tenderness] : no ~M costovertebral angle tenderness [Normal Gait] : normal gait [No Joint Swelling] : no joint swelling seen [Normal Strength/Tone] : muscle strength and tone were normal [No Rash] : no rash [No Tremors] : no tremors [Normal Sensation on Monofilament Testing] : normal sensation on monofilament testing of lower extremities [Normal Affect] : the affect was normal [Normal Mood] : the mood was normal [Kyphosis] : no kyphosis present [Foot Ulcers] : no foot ulcers [de-identified] : Moderately obese [de-identified] : No distinct corneal arcus [de-identified] : Thyroidectomy scar.  No palpable thyroid tissue [de-identified] : No murmurs [de-identified] : Minimal non-pitting edema in both LEs.  DP pulses 2+ on the elft, non-palpable on the right [de-identified] : Obese.  Liver edge 2-3 FB below the RCM [de-identified] : No cervical or supraclavicular adenopathy [de-identified] : Moderate acanthosis in the posterior cervical area [de-identified] : Vibratory sensation moderately decreased over the toes no

## 2022-07-08 ENCOUNTER — APPOINTMENT (OUTPATIENT)
Dept: ENDOCRINOLOGY | Facility: CLINIC | Age: 67
End: 2022-07-08

## 2022-07-08 VITALS
TEMPERATURE: 97.3 F | DIASTOLIC BLOOD PRESSURE: 65 MMHG | OXYGEN SATURATION: 98 % | HEART RATE: 94 BPM | SYSTOLIC BLOOD PRESSURE: 105 MMHG | WEIGHT: 203 LBS | HEIGHT: 67.5 IN | BODY MASS INDEX: 31.49 KG/M2 | RESPIRATION RATE: 16 BRPM

## 2022-07-08 DIAGNOSIS — E11.65 TYPE 2 DIABETES MELLITUS WITH HYPERGLYCEMIA: ICD-10-CM

## 2022-07-08 DIAGNOSIS — E66.9 OBESITY, UNSPECIFIED: ICD-10-CM

## 2022-07-08 DIAGNOSIS — E78.2 MIXED HYPERLIPIDEMIA: ICD-10-CM

## 2022-07-08 DIAGNOSIS — Z79.4 TYPE 2 DIABETES MELLITUS WITH HYPERGLYCEMIA: ICD-10-CM

## 2022-07-08 DIAGNOSIS — I10 ESSENTIAL (PRIMARY) HYPERTENSION: ICD-10-CM

## 2022-07-08 DIAGNOSIS — E89.0 POSTPROCEDURAL HYPOTHYROIDISM: ICD-10-CM

## 2022-07-08 DIAGNOSIS — R80.9 PROTEINURIA, UNSPECIFIED: ICD-10-CM

## 2022-07-08 PROCEDURE — 99215 OFFICE O/P EST HI 40 MIN: CPT

## 2022-07-08 RX ORDER — NIFEDIPINE 30 MG/1
30 TABLET, FILM COATED, EXTENDED RELEASE ORAL DAILY
Qty: 90 | Refills: 3 | Status: ACTIVE | COMMUNITY
Start: 2022-07-08 | End: 1900-01-01

## 2022-07-09 RX ORDER — INSULIN ASPART 100 [IU]/ML
100 INJECTION, SOLUTION INTRAVENOUS; SUBCUTANEOUS
Refills: 0 | Status: ACTIVE | COMMUNITY
Start: 2022-06-11

## 2022-07-09 RX ORDER — INSULIN LISPRO 100 [IU]/ML
100 INJECTION, SOLUTION INTRAVENOUS; SUBCUTANEOUS
Qty: 25 | Refills: 3 | Status: DISCONTINUED | COMMUNITY
Start: 2017-10-13 | End: 2022-07-09

## 2022-07-10 PROBLEM — R80.9 MICROALBUMINURIA: Status: ACTIVE | Noted: 2019-02-10

## 2022-07-10 PROBLEM — E78.2 MIXED HYPERLIPIDEMIA: Status: ACTIVE | Noted: 2018-01-14

## 2022-07-10 PROBLEM — E89.0 POST-SURGICAL HYPOTHYROIDISM: Status: ACTIVE | Noted: 2018-01-14

## 2022-07-10 PROBLEM — E66.9 OBESITY (BMI 30.0-34.9): Status: ACTIVE | Noted: 2018-01-14

## 2022-07-10 PROBLEM — I10 ESSENTIAL HYPERTENSION: Status: ACTIVE | Noted: 2018-01-14

## 2022-07-10 PROBLEM — E11.65 TYPE 2 DIABETES MELLITUS WITH HYPERGLYCEMIA, WITH LONG-TERM CURRENT USE OF INSULIN: Status: ACTIVE | Noted: 2018-07-24

## 2022-07-10 RX ORDER — INSULIN GLARGINE 100 [IU]/ML
100 INJECTION, SOLUTION SUBCUTANEOUS
Qty: 5 | Refills: 5 | Status: DISCONTINUED | COMMUNITY
Start: 2021-06-11 | End: 2022-07-10

## 2022-07-10 RX ORDER — INSULIN GLARGINE 100 [IU]/ML
100 INJECTION, SOLUTION SUBCUTANEOUS
Refills: 0 | Status: ACTIVE | COMMUNITY
Start: 2022-07-10

## 2022-07-10 RX ORDER — INSULIN GLARGINE 100 [IU]/ML
100 INJECTION, SOLUTION SUBCUTANEOUS
Qty: 15 | Refills: 3 | Status: DISCONTINUED | COMMUNITY
Start: 2017-10-13 | End: 2022-07-10

## 2022-07-10 RX ORDER — NIFEDIPINE 60 MG/1
60 TABLET, EXTENDED RELEASE ORAL DAILY
Qty: 90 | Refills: 3 | Status: DISCONTINUED | COMMUNITY
Start: 2019-05-15 | End: 2022-07-10

## 2022-07-10 RX ORDER — EZETIMIBE 10 MG/1
10 TABLET ORAL DAILY
Qty: 90 | Refills: 3 | Status: DISCONTINUED | COMMUNITY
Start: 2018-07-24 | End: 2022-07-10

## 2022-07-10 NOTE — REVIEW OF SYSTEMS
[Constipation] : constipation [Fatigue] : no fatigue [Decreased Appetite] : appetite not decreased [Fever] : no fever [Chills] : no chills [Dry Eyes] : no dryness [Blurred Vision] : no blurred vision [Dysphagia] : no dysphagia [Eyes Itch] : no itch [Hearing Loss] : no hearing loss  [Chest Pain] : no chest pain [Palpitations] : no palpitations [Lower Ext Edema] : no lower extremity edema [Shortness Of Breath] : no shortness of breath [Cough] : no cough [Wheezing] : no wheezing [SOB on Exertion] : no shortness of breath on exertion [Nausea] : no nausea [Heartburn] : no heartburn [Diarrhea] : no diarrhea [Polyuria] : no polyuria [Dysuria] : no dysuria [Joint Pain] : no joint pain [Muscle Weakness] : no muscle weakness [Myalgia] : no myalgia  [Joint Stiffness] : no joint stiffness [Dry Skin] : no dry skin [Hair Loss] : no hair loss [Ulcer] : no ulcer [Headaches] : no headaches [Difficulty Walking] : no difficulty walking [Tremors] : no tremors [Pain/Numbness of Digits] : no pain/numbness of digits [Depression] : no depression [Anxiety] : no anxiety [Stress] : no stress [Polydipsia] : no polydipsia [Cold Intolerance] : no cold intolerance [Heat Intolerance] : no heat intolerance [Easy Bleeding] : no ~M tendency for easy bleeding [Easy Bruising] : no tendency for easy bruising [FreeTextEntry8] : Nocturia 3X/night [FreeTextEntry2] : Has lost 2 lb since her last visit

## 2022-07-10 NOTE — PHYSICAL EXAM
[Alert] : alert [Healthy Appearance] : healthy appearance [Normal Sclera/Conjunctiva] : normal sclera/conjunctiva [EOMI] : extra ocular movement intact [No Proptosis] : no proptosis [No Lid Lag] : no lid lag [Normal Outer Ear/Nose] : the ears and nose were normal in appearance [Normal Hearing] : hearing was normal [No Neck Mass] : no neck mass was observed [No LAD] : no lymphadenopathy [Clear to Auscultation] : lungs were clear to auscultation bilaterally [No Murmurs] : no murmurs [Normal Rate] : heart rate was normal [Regular Rhythm] : with a regular rhythm [Carotids Normal] : carotid pulses were normal with no bruits [No Edema] : no peripheral edema [Not Tender] : non-tender [Soft] : abdomen soft [Normal Supraclavicular Nodes] : no supraclavicular lymphadenopathy [Normal Anterior Cervical Nodes] : no anterior cervical lymphadenopathy [No CVA Tenderness] : no ~M costovertebral angle tenderness [Normal Gait] : normal gait [No Involuntary Movements] : no involuntary movements were seen [No Joint Swelling] : no joint swelling seen [Normal Strength/Tone] : muscle strength and tone were normal [No Rash] : no rash [No Tremors] : no tremors [Normal Sensation on Monofilament Testing] : normal sensation on monofilament testing of lower extremities [Normal Affect] : the affect was normal [Normal Mood] : the mood was normal [PERRL] : pupils equal, round and reactive to light [Normal] : normal [0] : 0 in the dorsalis pedis [Vibration Dec.] : diminished vibratory sensation at the level of the toes [Kyphosis] : no kyphosis present [Abdominal Striae] : no abdominal striae [Foot Ulcers] : no foot ulcers [Delayed in the Right Toes] : normal in the toes [Delayed in the Left Toes] : normal in the toes [Position Sense Dec.] : normal position sense at the level of the toes [#1 Diminished] : number 1 was normal [#2 Diminished] : number 2 was normal [#3 Diminished] : number 3 was normal [#4 Diminished] : number 4 was normal [#5 Diminished] : number 5 was normal [#6 Diminished] : number 6 was normal [#7 Diminished] : number 7 was normal [#8 Diminished] : number 8 was normal [#9 Diminished] : number 9 was normal [#10 Diminished] : number 10 was normal [de-identified] : Moderately obese [de-identified] : No corneal arcus [de-identified] : DP pulses non-palpable bilaterally, but capillary refill is normal [de-identified] : Thyroidectomy scar.  No palpable thyroid tissue [de-identified] : Liver edge 3 FB below the RCM.  Areas of mild lipohypertrophy on either side of the umbilicus [de-identified] : Mild acanthosis nigricans in the posterior cervical area [de-identified] : Vibratory sensation significantly decreased over the toes

## 2022-07-10 NOTE — HISTORY OF PRESENT ILLNESS
[FreeTextEntry1] : 66-year-old woman who is followed for insulin-requiring type 2 DM, hyperlipidemia, post-surgical hypothyroidism and hypertension.  Her diabetes was initially diagnosed in 2004, and she has been insulin-requiring since 2011.  Her glycemic control has been generally quite poor (A1c levels 9-11%), primarily as a result of diet lapses and the difficulties in establishing an optimal meal/insulin schedule since she worked at night. Her control has improved since her nursing home in 2021.  Her disease has been complicated by  non-proliferative retinopathy and by microalbuminuria.  She has also been hypothyroid since undergoing a sub-total thyroidectomy in 2007 for a large multinodular goiter with compressive symptoms.  Her other active medical problems include hepatic steatosis and obesity..\par \malcolm Has not had any acute illnesses since her last visit, and says that she feels quite well.\malcolm Saw her cardiologist last month, and he suggested stopping the nifedipine, because she "is on too many medications."\par Her BPs checked at home have been nearly all < 130/80.\malcolm Is taking 42 units of Basaglar qhs, premeal Novolog 28 units\par Testing fingersticks only in the morning, and these have been below 100 on an average of twice a week.  Has had very few values over 130.  Had one night when she tested at bedtime, and this was 112. She was 117 the next morning.\malcolm Had one hypoglycemic episode in the late morning a few weeks ago, likely due to insufficient carb at breakfast that day..  \par Diet reviewed:\par --Breakfast is 1 cup of oatmeal, half a banana and a [employer] bagel.\par --Lunch is the main meal of the day.  Starch is either brown rice (not measured) or a yam. \malcolm --Eats only a small meal at night, often a salad with some crackers, otherwise a peanut butter sandwich\malcolm --Has fruit in the late afternoon--can be strawberries, mangoes, grapes\malcolm Will be seeing her ophthalmologist next month.  (Last exam was over a year ago)\malcolm Denies any numbness or paresthesias in her toes\malcolm Has been having problems scheduling appointments with several of her other MDs--needs to see her gastroenterologist for follow-up of the pancreatic lesion, as well as her neurologist.

## 2022-07-10 NOTE — ASSESSMENT
[FreeTextEntry1] : 1) Type 2 DM:  Glycemic control has continued to improve, with her current A1c level barely above goal.\par --Given the frequency of morning fingersticks which are below 100, will decrease the Basaglar to 36 units\par --Can continue the premeal dose of 28 units, but decrease this to 14 units when she has a salad as the third meal\par --Clearly needs additional post-prandial monitoring, which would ideally be accomplished with a CGM.  She is to check on coverage for either a Dexcom or Bernardo device\par --Needs to decrease her carb intake at breakfast.  Since she wants to continue having the small bagel, will decrease the oatmeal to 1/2 cup\par --To measure out the rice at lunch, and keep this to approximately 3/4 cup\par --Given her difficulty losing weight, and in order to decrease her premeal insulin requirements, she is also to check on coverage for Trulicity or Ozempic\par \par 2) Hypothyroidism:  TSH level is in the optimal range of 0.4-2.5 uU/ml\par --Continue levothyroxine 75 mcg/day\par \par 3) Hyperlipidemia:  LDL-cholesterol target is 70 mg% given the coronary artery calcification noted on CT scan.  She is still well below goal on her current regimen, and will try stopping the Zetia.\par --Continue monotherapy with atorvastatin 80 mg/day.  (D/C Zetia)\par \par 4) Microalbuminuria:  This has now regressed completely--ratios have been < 30 on the last two lab tests.  The improvement may well be due to better BP control.\par --Continue enalapril 20 mg BID \par --Strict BP control\par \par 5) Hypertension:  BP is borderline low at today's visit, and her readings at home have been nearly all in the optimal range.  Agree with her cardiologist about the nifedipine, but will taper it rather than discontinue it entirely.\par --Decrease the nifedipine-ER to 30 mg/day\par --Pt is to continue monitoring at home, and let me know if BPs stay < 130/80, in which will D/C the nifedipine entirely\par --Continue HCTZ and enalapril.  Pt to check on whether she is still taking labetalol\par \par 6) Obesity:  Pt is slowly losing weight, but would like to accelerate this.\par --Will add a GLP-1 to her regimen (see above)\par \par See for follow-up in 3 months.  CMP, lipids, A1c, microalbumin and TFTs before the visit [FreeTextEntry2] : Diet, BP targets, CGM

## 2022-07-10 NOTE — DATA REVIEWED
[FreeTextEntry1] : [facility]  (5/16/22)\par \par FBS 89,  A1c 7.1%\par CMP and CBC WNL\par LDL 40, HDL 32, TG 42\par Urine microalbumin ratio negative at 3.0 (normal < 30)\par TSH 0.507 uU/ml  (0.45-4.5)

## 2022-10-06 ENCOUNTER — APPOINTMENT (OUTPATIENT)
Dept: ENDOCRINOLOGY | Facility: CLINIC | Age: 67
End: 2022-10-06

## 2023-03-31 NOTE — HISTORY OF PRESENT ILLNESS
[FreeTextEntry1] : 65-year-old woman who is followed for insulin-requiring type 2 DM, hyperlipidemia, post-surgical hypothyroidism and hypertension.  Her diabetes was initially diagnosed in 2004, and she has been insulin-requiring since 2011.  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  Her disease has been complicated by significant non-proliferative retinopathy and by microalbuminuria.  She has also been hypothyroid since undergoing a sub-total thyroidectomy in 2007 for a large multinodular goiter with compressive symptoms.  Her other active medical problems include hepatic steatosis and obesity..\par \malcolm Says that her blood sugars had been about the same as usual until 2 weeks ago, when they began to rise due to stress.  At present she does not have a prescription plan, and has been paying for all of her medications out of pocket\malcolm Admits that she has been taking Humalog only before lunch and supper because of the cost.  \malcolm Says that she has been testing fingersticks twice a day but did not bring a log.\par BPs had been elevated until the past two weeks, when they improved to < 140/85\malcolm Saw her cardiologist, had an echo which showed LVH, an EF of 55% and diastolic dysfunction.\malcolm Is still napping during the day, not sleeping well at night,  (Her nap during the day has been cut down to 1-2 hours, however).\malcolm Appears to be doing very little during the day except watching TV.\malcolm Does an exercise walk for 30 min twice a week.\par --Has been getting up at 10 AM.  Fingersticks are 180-200.\par --Taking 28 units of Humalog before breakfast, which is now 1 cup of hot cereal plus a full-sized bagel.  Does not test a fingerstick after the meal.\par --Has a sandwich for lunch (2 PM), but does not take any insulin before the meal.\par --Fingerstick before supper (6-7 PM) are in the 200 range.  Starch at supper is rice, but she cannot tell me the portion.  Takes 28 units of Humalog before supper, but does not test after the meal.\par Next ophth exam is in July.\par Denies any numbness or paresthesias in her feet.\par   no